# Patient Record
Sex: FEMALE | Race: WHITE | NOT HISPANIC OR LATINO | Employment: FULL TIME
[De-identification: names, ages, dates, MRNs, and addresses within clinical notes are randomized per-mention and may not be internally consistent; named-entity substitution may affect disease eponyms.]

---

## 2017-06-10 ENCOUNTER — HEALTH MAINTENANCE LETTER (OUTPATIENT)
Age: 22
End: 2017-06-10

## 2017-07-01 ENCOUNTER — HEALTH MAINTENANCE LETTER (OUTPATIENT)
Age: 22
End: 2017-07-01

## 2017-08-12 ENCOUNTER — HEALTH MAINTENANCE LETTER (OUTPATIENT)
Age: 22
End: 2017-08-12

## 2017-12-19 ENCOUNTER — TELEPHONE (OUTPATIENT)
Dept: PEDIATRICS | Facility: CLINIC | Age: 22
End: 2017-12-19

## 2019-01-05 ENCOUNTER — NURSE TRIAGE (OUTPATIENT)
Dept: NURSING | Facility: CLINIC | Age: 24
End: 2019-01-05

## 2019-01-05 NOTE — TELEPHONE ENCOUNTER
Najma is suppose to  birth control and pharmacy is closed.  FNA advised to contact pharmacy on Monday or go into Urgent Care.

## 2020-08-04 ENCOUNTER — NURSE TRIAGE (OUTPATIENT)
Dept: NURSING | Facility: CLINIC | Age: 25
End: 2020-08-04

## 2020-08-05 NOTE — TELEPHONE ENCOUNTER
Patient reports that she was woken up by a sharp pain in her left ear.  Feels like it is coming from inside her ear.     Pain intermittent.  Feels like a pressure between the sharp pains.  Started about 20 minutes ago.     Home care advice given per protocol.     Nichol Yin RN/[facility]    COVID 19 Nurse Triage Plan/Patient Instructions    Please be aware that novel coronavirus (COVID-19) may be circulating in the community. If you develop symptoms such as fever, cough, or SOB or if you have concerns about the presence of another infection including coronavirus (COVID-19), please contact your health care provider or visit [email or web address].     Disposition/Instructions    Home care recommended. Follow home care protocol based instructions.    Thank you for taking steps to prevent the spread of this virus.  o Limit your contact with others.  o Wear a simple mask to cover your cough.  o Wash your hands well and often.    Resources    [facility]: About COVID-19: [email or web address]    CDC: What to Do If You're Sick: www.cdc.gov/coronavirus/2019-ncov/about/steps-when-sick.html    CDC: Ending Home Isolation: www.cdc.gov/coronavirus/2019-ncov/hcp/disposition-in-home-patients.html     CDC: Caring for Someone: www.cdc.gov/coronavirus/2019-ncov/if-you-are-sick/care-for-someone.html     [facility]: Interim Guidance for Hospital Discharge to Home: www.health.[facility].mn.us/diseases/coronavirus/hcp/hospdischarge.pdf    [facility] clinical trials (COVID-19 research studies): [email or web address].[facility]/n-clinical-trials     Below are the COVID-19 hotlines at the [place] Department of Health ([facility]). Interpreters are available.   o For health questions: Call 212-797-2850 or 1-860.253.7549 (7 a.m. to 7 p.m.)  o For questions about schools and childcare: Call 779-806-9737 or 1-890.647.7020 (7 a.m. to 7 p.m.)     Additional Information    Negative: Moving the earlobe or touching the ear  clearly increases the pain    Negative: Foreign body struck in the ear (e.g., bug, piece of cotton)    Negative: Followed an ear injury    Negative: [1] Recently diagnosed with otitis media AND [2] currently on oral antibiotics    Negative: [1] Stiff neck (unable to touch chin to chest) AND [2] fever    Negative: [1] Bony area of skull behind the ear is pink or swollen AND [2] fever    Negative: Fever > 104 F (40 C)    Negative: Patient sounds very sick or weak to the triager    Negative: [1] SEVERE pain AND [2] not improved 2 hours after taking analgesic medication (e.g., ibuprofen or acetaminophen)    Negative: Walking is very unsteady    Negative: Sudden onset of ear pain after long - thin object was inserted into the ear canal (e.g., pencil, Q-tip)    Negative: Diabetes mellitus or weak immune system (e.g., HIV positive, cancer chemo, splenectomy, organ transplant, chronic steroids)    Negative: New blurred vision or vision changes    Negative: White, yellow, or green discharge    Negative: Bloody discharge or unexplained bleeding from ear canal    Negative: Earache  (Exceptions: brief ear pain of < 60 minutes duration, earache occurring during air travel    Negative: Mild earache and ear congestion (fullness) occurring during air travel    Earache < 60 minutes duration that is now completely gone    Protocols used: EARACHE-A-AH

## 2021-02-01 ENCOUNTER — TRANSFERRED RECORDS (OUTPATIENT)
Dept: HEALTH INFORMATION MANAGEMENT | Facility: CLINIC | Age: 26
End: 2021-02-01

## 2021-02-01 LAB — PAP SMEAR - HIM PATIENT REPORTED: NEGATIVE

## 2021-03-09 ENCOUNTER — OFFICE VISIT (OUTPATIENT)
Dept: FAMILY MEDICINE | Facility: CLINIC | Age: 26
End: 2021-03-09
Payer: COMMERCIAL

## 2021-03-09 VITALS
DIASTOLIC BLOOD PRESSURE: 85 MMHG | RESPIRATION RATE: 16 BRPM | BODY MASS INDEX: 21.79 KG/M2 | HEART RATE: 75 BPM | WEIGHT: 123 LBS | TEMPERATURE: 98.1 F | HEIGHT: 63 IN | SYSTOLIC BLOOD PRESSURE: 122 MMHG | OXYGEN SATURATION: 100 %

## 2021-03-09 DIAGNOSIS — Z00.00 ROUTINE GENERAL MEDICAL EXAMINATION AT A HEALTH CARE FACILITY: Primary | ICD-10-CM

## 2021-03-09 PROCEDURE — 99385 PREV VISIT NEW AGE 18-39: CPT | Performed by: PHYSICIAN ASSISTANT

## 2021-03-09 ASSESSMENT — MIFFLIN-ST. JEOR: SCORE: 1277.05

## 2021-03-09 NOTE — PROGRESS NOTES
SUBJECTIVE:   CC: Najma Rodríguez is an 25 year old woman who presents for preventive health visit.     Patient has been advised of split billing requirements and indicates understanding: Yes  Healthy Habits:     Getting at least 3 servings of Calcium per day:  NO    Bi-annual eye exam:  Yes    Dental care twice a year:  Yes    Sleep apnea or symptoms of sleep apnea:  None    Diet:  Vegetarian/vegan    Frequency of exercise:  2-3 days/week    Duration of exercise:  15-30 minutes    Taking medications regularly:  Yes    Medication side effects:  None    PHQ-2 Total Score: 0    Additional concerns today:  No    Pap done at  Feb 2021, within normal limits.  IUD placed 11/20    Today's PHQ-2 Score:   PHQ-2 ( 1999 Pfizer) 3/9/2021   Q1: Little interest or pleasure in doing things 0   Q2: Feeling down, depressed or hopeless 0   PHQ-2 Score 0   Q1: Little interest or pleasure in doing things Not at all   Q2: Feeling down, depressed or hopeless Not at all   PHQ-2 Score 0       Abuse: Current or Past (Physical, Sexual or Emotional) - No  Do you feel safe in your environment? Yes    Have you ever done Advance Care Planning? (For example, a Health Directive, POLST, or a discussion with a medical provider or your loved ones about your wishes): No, advance care planning information given to patient to review.  Patient plans to discuss their wishes with loved ones or provider.      Social History     Tobacco Use     Smoking status: Never Smoker     Smokeless tobacco: Never Used   Substance Use Topics     Alcohol use: No         Alcohol Use 3/9/2021   Prescreen: >3 drinks/day or >7 drinks/week? No       Reviewed orders with patient.  Reviewed health maintenance and updated orders accordingly - Yes  Labs reviewed in EPIC  BP Readings from Last 3 Encounters:   03/09/21 122/85   07/30/15 104/60   06/10/14 108/64    Wt Readings from Last 3 Encounters:   03/09/21 55.8 kg (123 lb)   07/30/15 50.3 kg (110 lb 12.8 oz) (18 %, Z=  -0.92)*   06/10/14 48.4 kg (106 lb 9.6 oz) (14 %, Z= -1.08)*     * Growth percentiles are based on CDC (Girls, 2-20 Years) data.                  Patient Active Problem List   Diagnosis     Acne     Short stature     Pituitary dwarfism (H)     History reviewed. No pertinent surgical history.    Social History     Tobacco Use     Smoking status: Never Smoker     Smokeless tobacco: Never Used   Substance Use Topics     Alcohol use: No     History reviewed. No pertinent family history.      Current Outpatient Medications   Medication Sig Dispense Refill     Multiple Vitamin (MULTIVITAMIN OR) Take  by mouth daily.       No Known Allergies  No lab results found.         History of abnormal Pap smear: NO - age 21-29 PAP every 3 years recommended; [facility] 02/2021 WNL/NIL     Reviewed and updated as needed this visit by clinical staff  Tobacco  Allergies  Meds  Problems  Med Hx  Surg Hx  Fam Hx          Reviewed and updated as needed this visit by Provider  Tobacco  Allergies  Meds  Problems  Med Hx  Surg Hx  Fam Hx         History reviewed. No pertinent past medical history.   History reviewed. No pertinent surgical history.  OB History   No obstetric history on file.       Review of Systems  CONSTITUTIONAL: NEGATIVE for fever, chills, change in weight  INTEGUMENTARU/SKIN: NEGATIVE for worrisome rashes, moles or lesions  EYES: NEGATIVE for vision changes or irritation  ENT: NEGATIVE for ear, mouth and throat problems  RESP: NEGATIVE for significant cough or SOB  BREAST: NEGATIVE for masses, tenderness or discharge  CV: NEGATIVE for chest pain, palpitations or peripheral edema  GI: NEGATIVE for nausea, abdominal pain, heartburn, or change in bowel habits  : NEGATIVE for unusual urinary or vaginal symptoms. Periods are regular.  MUSCULOSKELETAL: NEGATIVE for significant arthralgias or myalgia  NEURO: NEGATIVE for weakness, dizziness or paresthesias  PSYCHIATRIC: NEGATIVE for changes in mood or  "affect     OBJECTIVE:   /85   Pulse 75   Temp 98.1  F (36.7  C)   Resp 16   Ht 1.6 m (5' 3\")   Wt 55.8 kg (123 lb)   SpO2 100%   BMI 21.79 kg/m    Physical Exam  GENERAL: healthy, alert and no distress  EYES: Eyes grossly normal to inspection, PERRL and conjunctivae and sclerae normal  HENT: ear canals and TM's normal, nose and mouth without ulcers or lesions  NECK: no adenopathy, no asymmetry, masses, or scars and thyroid normal to palpation  RESP: lungs clear to auscultation - no rales, rhonchi or wheezes  CV: regular rate and rhythm, normal S1 S2, no S3 or S4, no murmur, click or rub, no peripheral edema and peripheral pulses strong  ABDOMEN: soft, nontender, no hepatosplenomegaly, no masses and bowel sounds normal  MS: no gross musculoskeletal defects noted, no edema  SKIN: no suspicious lesions or rashes  NEURO: Normal strength and tone, mentation intact and speech normal  PSYCH: mentation appears normal, affect normal/bright    Diagnostic Test Results:  Labs reviewed in Epic    ASSESSMENT/PLAN:       ICD-10-CM    1. Routine general medical examination at a health care facility  Z00.00        Patient has been advised of split billing requirements and indicates understanding: Yes  COUNSELING:  Reviewed preventive health counseling, as reflected in patient instructions       Regular exercise       Healthy diet/nutrition       Safe sex practices/STD prevention    Estimated body mass index is 21.79 kg/m  as calculated from the following:    Height as of this encounter: 1.6 m (5' 3\").    Weight as of this encounter: 55.8 kg (123 lb).        She reports that she has never smoked. She has never used smokeless tobacco.      Counseling Resources:  ATP IV Guidelines  Pooled Cohorts Equation Calculator  Breast Cancer Risk Calculator  BRCA-Related Cancer Risk Assessment: FHS-7 Tool  FRAX Risk Assessment  ICSI Preventive Guidelines  Dietary Guidelines for Americans, 2010  USDA's MyPlate  ASA Prophylaxis  Lung " CA Screening    Mindi Olson PA-C  [facility]

## 2021-06-03 ENCOUNTER — E-VISIT (OUTPATIENT)
Dept: FAMILY MEDICINE | Facility: CLINIC | Age: 26
End: 2021-06-03
Payer: COMMERCIAL

## 2021-06-03 DIAGNOSIS — L70.0 ACNE VULGARIS: Primary | ICD-10-CM

## 2021-06-03 PROCEDURE — 99421 OL DIG E/M SVC 5-10 MIN: CPT | Performed by: FAMILY MEDICINE

## 2021-06-04 RX ORDER — BENZOYL PEROXIDE 5 G/100G
GEL TOPICAL 2 TIMES DAILY
Qty: 90 G | Refills: 2 | Status: SHIPPED | OUTPATIENT
Start: 2021-06-04 | End: 2022-05-05 | Stop reason: ALTCHOICE

## 2021-06-04 RX ORDER — CLINDAMYCIN PHOSPHATE 10 UG/ML
LOTION TOPICAL 2 TIMES DAILY
Qty: 60 ML | Refills: 2 | Status: SHIPPED | OUTPATIENT
Start: 2021-06-04 | End: 2022-05-05 | Stop reason: ALTCHOICE

## 2021-06-04 NOTE — PATIENT INSTRUCTIONS
"FUTURE APPOINTMENTS  Follow up in 3 week(s)    ACNE TREATMENT PLAN  Morning or Evening (whenever you shower) :    OTC cleanser with benzoyl peroxide 5% wash (not gel form)..    Do a \"20/10\" wash (20 seconds of skin contact with the cleanser followed by a 10 second rinse)    Apply to forehead, cheeks, chin, nose, jawline and neck.  (FYI Note - Benzoyl peroxide washes can bleach clothing, so try to use disposable or old towels and clothes.)    After cleansing, medication : apply topical Clindamycin 1%.    Evening:    OTC cleanser with benzoyl peroxide 5% wash (not gel form). - Do a \"20 / 10 wash\" again.    After cleansing, medication: apply topical Clindamycin 1%.    Recommendations if skin becomes too dry in response to medication:    Use Cetaphil facial moisturizer.    Decrease use of Benzaderm or Benzoyl Peroxide wash to once per day. If still too irritating, decrease benzoyl peroxide product to 5%.    Recommended brands include: PanOxyl 4% creamy wash, Clean & Clear Advantage oil absorbing 5.5% cleanser, Neutrogena Clear Pore 3.7% daily scrub, CVS 4% creamy face wash. Topix Benzaderm (changing name to Replenix) 5% wash can be found on amazon.com.      "

## 2021-06-28 ENCOUNTER — VIRTUAL VISIT (OUTPATIENT)
Dept: DERMATOLOGY | Facility: CLINIC | Age: 26
End: 2021-06-28
Payer: COMMERCIAL

## 2021-06-28 DIAGNOSIS — L70.0 ACNE VULGARIS: Primary | ICD-10-CM

## 2021-06-28 DIAGNOSIS — L81.0 POSTINFLAMMATORY HYPERPIGMENTATION: ICD-10-CM

## 2021-06-28 PROCEDURE — 99203 OFFICE O/P NEW LOW 30 MIN: CPT | Mod: 95 | Performed by: DERMATOLOGY

## 2021-06-28 ASSESSMENT — PAIN SCALES - GENERAL: PAINLEVEL: NO PAIN (0)

## 2021-06-28 NOTE — LETTER
6/28/2021       RE: Najma Rodríguez  [address]  [place] [address]     Dear Colleague,    Thank you for referring your patient, Najma Rodríguez, to the [facility] DERMATOLOGY [facility] [place] at [facility]. Please see a copy of my visit note below.    [facility] Dermatology Note  Encounter Date: Jun 28, 2021  Store-and-Forward and Video ([phone]). Location of teledermatologist: [facility] [place].  Start time: 8:35. End time: 9:03.    Dermatology Problem List:  1. Acne vulgaris: mixed inflammatory/comedonal with postinflammatory dyspigmentation  - proposed treatment: spironolactone 100 mg at bedtime, tretinoin 0.025% cream, benzoyl peroxide 5% wash (patient to decide what she is comfortable with)  - has hormonal IUD    ____________________________________________    Assessment & Plan:     1. Acne vulgaris: mixed inflammatory/comedonal with postinflammatory dyspigmentation. We spent some time discussing next steps in treatment including risks/benefits of tretinoin, benzoyl peroxide, spironolactone, oral contraceptives, adapalene, tazarotene, clindamycin. I do recommend spironolactone + topicals to achieve best results; concomitant hormonal IUD is in place. Patient will think about treatment options and contact us with her preferred plan.    Procedures Performed:    None    Follow-up: 3 months    Over 33 minutes was spent during this visit on the date of service, including >28 minutes of direct contact time with the patient counseling and coordinating care including the discussion and documentation of diagnosis, natural history, treatment, and prognosis. This excludes time spent performing any relevant procedures.    Staff:     Tawanda Ambriz MD   of Dermatology  Department of Dermatology  [facility]  [facility]    ____________________________________________    CC: Acne (Najma, is being seen for acne, no other concerns )    HPI:  Ms. Najma [place] is a(n) 26 year old female who presents today as a new patient for acne    Acne - consistent throughout middle school/high school  - started 12 years ago  - oral contraceptives in high school - helped acne - did lead to improvement throughout college but always had a few spots  - over last few months - coming back worse than in past  - changed to IUD in November  - skin care routine - using CeraVe cleanser BID, toner spray, moisturizer  - used clindamycin gel with tazarotene many years ago while in middle school  - has a fair amount of residual dyspigmentation    Patient is otherwise feeling well, without additional skin concerns.    Labs Reviewed:  N/A    Physical Exam:  Vitals: There were no vitals taken for this visit.  SKIN: Teledermatology photos were reviewed; image quality and interpretability: acceptable. Image date: 6/28/21.  - erythematous acneiform papules and admixed comedones with background dyspigmentation on the face with chin/jawline predominance  - No other lesions of concern on areas examined.     Medications:  Current Outpatient Medications   Medication     benzoyl peroxide 5 % topical gel     clindamycin (CLEOCIN T) 1 % external lotion     Multiple Vitamin (MULTIVITAMIN OR)     No current facility-administered medications for this visit.       Past Medical/Surgical History:   Patient Active Problem List   Diagnosis     Acne     Short stature     Pituitary dwarfism (H)     History reviewed. No pertinent past medical history.    CC Referred MD Darian  No address on file on close of this encounter.

## 2021-06-28 NOTE — NURSING NOTE
Chief Complaint   Patient presents with     Acne     Najma, is being seen for acne, no other concerns     Enio Goldman EMT

## 2021-06-28 NOTE — PROGRESS NOTES
[facility] Dermatology Note  Encounter Date: Jun 28, 2021  Store-and-Forward and Video ([phone]). Location of teledermatologist: [facility] DERMATOLOGY CLINIC [place].  Start time: 8:35. End time: 9:03.    Dermatology Problem List:  1. Acne vulgaris: mixed inflammatory/comedonal with postinflammatory dyspigmentation  - proposed treatment: spironolactone 100 mg at bedtime, tretinoin 0.025% cream, benzoyl peroxide 5% wash (patient to decide what she is comfortable with)  - has hormonal IUD    ____________________________________________    Assessment & Plan:     1. Acne vulgaris: mixed inflammatory/comedonal with postinflammatory dyspigmentation. We spent some time discussing next steps in treatment including risks/benefits of tretinoin, benzoyl peroxide, spironolactone, oral contraceptives, adapalene, tazarotene, clindamycin. I do recommend spironolactone + topicals to achieve best results; concomitant hormonal IUD is in place. Patient will think about treatment options and contact us with her preferred plan.    Procedures Performed:    None    Follow-up: 3 months    Over 33 minutes was spent during this visit on the date of service, including >28 minutes of direct contact time with the patient counseling and coordinating care including the discussion and documentation of diagnosis, natural history, treatment, and prognosis. This excludes time spent performing any relevant procedures.    Staff:     Tawanda Ambriz MD   of Dermatology  Department of Dermatology  [employer] [facility] [employer] [facility] [employer]    ____________________________________________    CC: Acne (Najma, is being seen for acne, no other concerns )    HPI:  Ms. Najma Rodríguez is a(n) 26 year old female who presents today as a new patient for acne    Acne - consistent throughout middle school/high school  - started 12 years ago  - oral contraceptives in high school - helped acne - did  lead to improvement throughout college but always had a few spots  - over last few months - coming back worse than in past  - changed to IUD in November  - skin care routine - using CeraVe cleanser BID, toner spray, moisturizer  - used clindamycin gel with tazarotene many years ago while in middle school  - has a fair amount of residual dyspigmentation    Patient is otherwise feeling well, without additional skin concerns.    Labs Reviewed:  N/A    Physical Exam:  Vitals: There were no vitals taken for this visit.  SKIN: Teledermatology photos were reviewed; image quality and interpretability: acceptable. Image date: 6/28/21.  - erythematous acneiform papules and admixed comedones with background dyspigmentation on the face with chin/jawline predominance  - No other lesions of concern on areas examined.     Medications:  Current Outpatient Medications   Medication     benzoyl peroxide 5 % topical gel     clindamycin (CLEOCIN T) 1 % external lotion     Multiple Vitamin (MULTIVITAMIN OR)     No current facility-administered medications for this visit.       Past Medical/Surgical History:   Patient Active Problem List   Diagnosis     Acne     Short stature     Pituitary dwarfism (H)     History reviewed. No pertinent past medical history.    CC Referred MD Darian  No address on file on close of this encounter.

## 2021-07-07 DIAGNOSIS — L70.0 ACNE VULGARIS: Primary | ICD-10-CM

## 2021-07-07 RX ORDER — TRETINOIN 0.25 MG/G
CREAM TOPICAL AT BEDTIME
Qty: 45 G | Refills: 3 | Status: SHIPPED | OUTPATIENT
Start: 2021-07-07 | End: 2021-09-20 | Stop reason: ALTCHOICE

## 2021-07-07 RX ORDER — SPIRONOLACTONE 50 MG/1
50 TABLET, FILM COATED ORAL AT BEDTIME
Qty: 30 TABLET | Refills: 3 | Status: SHIPPED | OUTPATIENT
Start: 2021-07-07 | End: 2021-09-20

## 2021-09-20 ENCOUNTER — VIRTUAL VISIT (OUTPATIENT)
Dept: DERMATOLOGY | Facility: CLINIC | Age: 26
End: 2021-09-20
Payer: COMMERCIAL

## 2021-09-20 DIAGNOSIS — L70.0 ACNE VULGARIS: Primary | ICD-10-CM

## 2021-09-20 PROCEDURE — 99213 OFFICE O/P EST LOW 20 MIN: CPT | Mod: 95 | Performed by: DERMATOLOGY

## 2021-09-20 RX ORDER — SPIRONOLACTONE 50 MG/1
150 TABLET, FILM COATED ORAL AT BEDTIME
Qty: 90 TABLET | Refills: 3 | Status: SHIPPED | OUTPATIENT
Start: 2021-09-20 | End: 2022-02-04

## 2021-09-20 RX ORDER — ADAPALENE 0.1 G/100G
CREAM TOPICAL AT BEDTIME
Qty: 45 G | Refills: 3 | Status: SHIPPED | OUTPATIENT
Start: 2021-09-20 | End: 2022-05-18

## 2021-09-20 ASSESSMENT — PAIN SCALES - GENERAL: PAINLEVEL: NO PAIN (0)

## 2021-09-20 NOTE — NURSING NOTE
Dermatology Rooming Note    Ravindra Rodríguez's goals for this visit include:   Chief Complaint   Patient presents with     Derm Problem     ravindra is having this visit today for a follow up on the acne, states that nothing has changed since her last visit      Danita Flor CMA on 9/20/2021 at 8:06 AM

## 2021-09-20 NOTE — PROGRESS NOTES
[facility] Dermatology Note  Encounter Date: Sep 20, 2021  Store-and-Forward and Video (invitation sent by [employer]). Location of teledermatologist: [facility] DERMAT[facility] [place]. Date of images: 09/20/21. Start time: 8:39. End time: 8:56    Dermatology Problem List:  1. Acne vulgaris: mixed inflammatory/comedonal with postinflammatory dyspigmentation  - spironolactone 150 mg at bedtime, adapalene 0.1% cream, benzoyl peroxide 5% wash  - has hormonal IUD  - past treatment: spironolactone 50 mg at bedtime (ineffective), tretinoin 0.025% cream (too irritating)    ____________________________________________    Assessment & Plan:     1. Acne vulgaris: no significant improvement on low-dose spironolactone. Having some irritation with tretinoin so will downtitrate to adapalene to see if can increase frequency while decreasing irritation. Discussed risks/benefits of increasing spironolactone vs isotretinoin; patient would prefer to avoid isotretinoin at this time.  - increase spironolactone to 100 mg at bedtime x2 weeks, then to 150 mg at bedtime thereafter  - if insufficient improvement on this dose at follow up, would plan for isotretinoin  - stop tretinoin and switch to adapalene 0.1% cream at bedtime      Procedures Performed:    None    Follow-up: 3 months    Staff:     Tawanda Ambriz MD, FAAD   of Dermatology  Department of Dermatology  [facility]    ____________________________________________    CC: Derm Problem (ravindra is having this visit today for a follow up on the acne, states that nothing has changed since her last visit )    HPI:  Ms. Ravindra Rodríguez is a(n) 26 year old female who presents today as a return patient for acne vulgaris    Acne vulgaris - haven't noticed much benefit - on spironolactone 50 mg  - using benzoyl peroxide wash; tretinoin cream at bedtime - could use 4 days per week before too much  irritation  - concern about potential side effects with isotretinoin    Patient is otherwise feeling well, without additional skin concerns.    Labs Reviewed:  N/A    Physical Exam:  Vitals: There were no vitals taken for this visit.  SKIN: Teledermatology photos were reviewed; image quality and interpretability: acceptable. Image date: 9/20/21.  - acneiform papules on the face; interval stability  - No other lesions of concern on areas examined.     Medications:  Current Outpatient Medications   Medication     benzoyl peroxide 5 % external liquid     benzoyl peroxide 5 % topical gel     clindamycin (CLEOCIN T) 1 % external lotion     Multiple Vitamin (MULTIVITAMIN OR)     spironolactone (ALDACTONE) 50 MG tablet     tretinoin (RETIN-A) 0.025 % external cream     No current facility-administered medications for this visit.      Past Medical/Surgical History:   Patient Active Problem List   Diagnosis     Acne     Short stature     Pituitary dwarfism (H)     No past medical history on file.

## 2021-09-20 NOTE — PATIENT INSTRUCTIONS
Spironolactone: take 1/2 tablet (50 mg) at bedtime for 2 weeks. If tolerating, increase to 1 tablet (100 mg) at bedtime thereafter.    Adapalene: use pea-sized amount to whole face every 2-3 nights. Gradually increase to nightly use as tolerated over 2-3 months.

## 2021-09-20 NOTE — LETTER
9/20/2021       RE: Ravindra Rodríguez  [address]  [facility] [address]     Dear Colleague,    Thank you for referring your patient, Ravindra Rodríguez, to the [facility] [place] at [facility]. Please see a copy of my visit note below.    [facility] Dermatology Note  Encounter Date: Sep 20, 2021  Store-and-Forward and Video (invitation sent by [employer]). Location of teledermatologist: [facility] [place]. Date of images: 09/20/21. Start time: 8:39. End time: 8:56    Dermatology Problem List:  1. Acne vulgaris: mixed inflammatory/comedonal with postinflammatory dyspigmentation  - spironolactone 150 mg at bedtime, adapalene 0.1% cream, benzoyl peroxide 5% wash  - has hormonal IUD  - past treatment: spironolactone 50 mg at bedtime (ineffective), tretinoin 0.025% cream (too irritating)    ____________________________________________    Assessment & Plan:     1. Acne vulgaris: no significant improvement on low-dose spironolactone. Having some irritation with tretinoin so will downtitrate to adapalene to see if can increase frequency while decreasing irritation. Discussed risks/benefits of increasing spironolactone vs isotretinoin; patient would prefer to avoid isotretinoin at this time.  - increase spironolactone to 100 mg at bedtime x2 weeks, then to 150 mg at bedtime thereafter  - if insufficient improvement on this dose at follow up, would plan for isotretinoin  - stop tretinoin and switch to adapalene 0.1% cream at bedtime      Procedures Performed:    None    Follow-up: 3 months    Staff:     Tawanda Ambriz MD, FAAD   of Dermatology  Department of Dermatology  [facility]  ____________________________________________    CC: Derm Problem (ravindra is having this visit today for a follow up on the acne, states that nothing has  changed since her last visit )    HPI:  Ms. Najma Rodríguez is a(n) 26 year old female who presents today as a return patient for acne vulgaris    Acne vulgaris - haven't noticed much benefit - on spironolactone 50 mg  - using benzoyl peroxide wash; tretinoin cream at bedtime - could use 4 days per week before too much irritation  - concern about potential side effects with isotretinoin    Patient is otherwise feeling well, without additional skin concerns.    Labs Reviewed:  N/A    Physical Exam:  Vitals: There were no vitals taken for this visit.  SKIN: Teledermatology photos were reviewed; image quality and interpretability: acceptable. Image date: 9/20/21.  - acneiform papules on the face; interval stability  - No other lesions of concern on areas examined.     Medications:  Current Outpatient Medications   Medication     benzoyl peroxide 5 % external liquid     benzoyl peroxide 5 % topical gel     clindamycin (CLEOCIN T) 1 % external lotion     Multiple Vitamin (MULTIVITAMIN OR)     spironolactone (ALDACTONE) 50 MG tablet     tretinoin (RETIN-A) 0.025 % external cream     No current facility-administered medications for this visit.      Past Medical/Surgical History:   Patient Active Problem List   Diagnosis     Acne     Short stature     Pituitary dwarfism (H)   No past medical history on file.

## 2021-10-24 ENCOUNTER — HEALTH MAINTENANCE LETTER (OUTPATIENT)
Age: 26
End: 2021-10-24

## 2021-12-20 ENCOUNTER — VIRTUAL VISIT (OUTPATIENT)
Dept: DERMATOLOGY | Facility: CLINIC | Age: 26
End: 2021-12-20
Payer: COMMERCIAL

## 2021-12-20 DIAGNOSIS — L70.0 ACNE VULGARIS: Primary | ICD-10-CM

## 2021-12-20 PROCEDURE — 99213 OFFICE O/P EST LOW 20 MIN: CPT | Mod: 95 | Performed by: DERMATOLOGY

## 2021-12-20 ASSESSMENT — PAIN SCALES - GENERAL: PAINLEVEL: NO PAIN (0)

## 2021-12-20 NOTE — LETTER
12/20/2021     RE: Najam Rodríguez  [address]  [facility] [address]     Dear Colleague,    Thank you for referring your patient, Najma Rodríguez, to the [facility] DERMATOLOGY [facility] [place] at [facility]. Please see a copy of my visit note below.    [facility] Dermatology Note  Encounter Date: Dec 20, 2021  [employer].  7:53-8:01    Dermatology Problem List:  1. Acne vulgaris: mixed inflammatory/comedonal with postinflammatory dyspigmentation  - spironolactone 150 mg at bedtime, adapalene 0.1% cream, benzoyl peroxide 5% wash  - has hormonal IUD  - past treatment: spironolactone 50 mg at bedtime (ineffective), tretinoin 0.025% cream (too irritating)     ____________________________________________    Assessment & Plan:     1. Acne vulgaris: overall doing well on spironolactone 150 mg at bedtime, adapalene every other day, and benzoyl peroxide nightly, with ~1 active pimple per month. We briefly discussed uptitration on spironolactone but given good response to date, will maintain current dose for now. Can gradually increase frequency of topical adapalene as tolerated, but okay to stay with every other day if too irritating.  - spironolactone 150 mg at bedtime  - adapalene 0.1% every other day, benzoyl peroxide 5% wash    Procedures Performed:    None    Follow-up: 6 months    Staff:     Tawanda Ambriz MD, FAAD   of Dermatology  Department of Dermatology  [facility] [employer]    ____________________________________________    CC: Acne (Najma, is here for an acne appt. )    HPI:  Ms. Najma Rodríguez is a(n) 26 year old female who presents today as a return patient for acne vulgaris    Acne vulgaris - haven't had breakouts recently - will get ~1 pimple/month  - tolerating 150 mg of spironolactone well without side effects  - using benzoyl peroxide wash nightly and  adapalene   - used adapalene daily for about a week, then too irritating, so reduced to every other day    Patient is otherwise feeling well, without additional skin concerns.    Labs Reviewed:  N/A    Physical Exam:  Vitals: There were no vitals taken for this visit.  SKIN: no photos  - No other lesions of concern on areas examined.     Medications:  Current Outpatient Medications   Medication     adapalene (DIFFERIN) 0.1 % external cream     benzoyl peroxide 5 % external liquid     benzoyl peroxide 5 % topical gel     clindamycin (CLEOCIN T) 1 % external lotion     Multiple Vitamin (MULTIVITAMIN OR)     spironolactone (ALDACTONE) 50 MG tablet     No current facility-administered medications for this visit.      Past Medical/Surgical History:   Patient Active Problem List   Diagnosis     Acne     Short stature     Pituitary dwarfism (H)     History reviewed. No pertinent past medical history.    CC Tawanda Ambriz MD  [address] [place] [id number] on close of this encounter.

## 2021-12-20 NOTE — NURSING NOTE
Chief Complaint   Patient presents with     Acne     Najma, is here for an acne appt.     Enio Goldman EMT

## 2021-12-20 NOTE — PROGRESS NOTES
[facility] Dermatology Note  Encounter Date: Dec 20, 2021  [employer].  7:53-8:01    Dermatology Problem List:  1. Acne vulgaris: mixed inflammatory/comedonal with postinflammatory dyspigmentation  - spironolactone 150 mg at bedtime, adapalene 0.1% cream, benzoyl peroxide 5% wash  - has hormonal IUD  - past treatment: spironolactone 50 mg at bedtime (ineffective), tretinoin 0.025% cream (too irritating)     ____________________________________________    Assessment & Plan:     1. Acne vulgaris: overall doing well on spironolactone 150 mg at bedtime, adapalene every other day, and benzoyl peroxide nightly, with ~1 active pimple per month. We briefly discussed uptitration on spironolactone but given good response to date, will maintain current dose for now. Can gradually increase frequency of topical adapalene as tolerated, but okay to stay with every other day if too irritating.  - spironolactone 150 mg at bedtime  - adapalene 0.1% every other day, benzoyl peroxide 5% wash    Procedures Performed:    None    Follow-up: 6 months    Staff:     Tawanda Ambriz MD, FAAD   of Dermatology  Department of Dermatology  [facility] [employer] [facility]    ____________________________________________    CC: Acne (Najma, is here for an acne appt. )    HPI:  Ms. Najma Rodríguez is a(n) 26 year old female who presents today as a return patient for acne vulgaris    Acne vulgaris - haven't had breakouts recently - will get ~1 pimple/month  - tolerating 150 mg of spironolactone well without side effects  - using benzoyl peroxide wash nightly and adapalene   - used adapalene daily for about a week, then too irritating, so reduced to every other day    Patient is otherwise feeling well, without additional skin concerns.    Labs Reviewed:  N/A    Physical Exam:  Vitals: There were no vitals taken for this visit.  SKIN: no photos  - No other lesions of concern on areas  examined.     Medications:  Current Outpatient Medications   Medication     adapalene (DIFFERIN) 0.1 % external cream     benzoyl peroxide 5 % external liquid     benzoyl peroxide 5 % topical gel     clindamycin (CLEOCIN T) 1 % external lotion     Multiple Vitamin (MULTIVITAMIN OR)     spironolactone (ALDACTONE) 50 MG tablet     No current facility-administered medications for this visit.      Past Medical/Surgical History:   Patient Active Problem List   Diagnosis     Acne     Short stature     Pituitary dwarfism (H)     History reviewed. No pertinent past medical history.    CC Tawanda Ambriz MD  [address] [place] [phone] on close of this encounter.

## 2022-02-01 ENCOUNTER — MYC MEDICAL ADVICE (OUTPATIENT)
Dept: DERMATOLOGY | Facility: CLINIC | Age: 27
End: 2022-02-01
Payer: COMMERCIAL

## 2022-02-04 DIAGNOSIS — L70.0 ACNE VULGARIS: ICD-10-CM

## 2022-02-04 RX ORDER — SPIRONOLACTONE 50 MG/1
150 TABLET, FILM COATED ORAL AT BEDTIME
Qty: 90 TABLET | Refills: 5 | Status: SHIPPED | OUTPATIENT
Start: 2022-02-04 | End: 2022-05-18

## 2022-04-10 ENCOUNTER — HEALTH MAINTENANCE LETTER (OUTPATIENT)
Age: 27
End: 2022-04-10

## 2022-05-04 NOTE — PATIENT INSTRUCTIONS
Patient to contact dermatology for scheduling changes: [phone]      Preventive Health Recommendations  Female Ages 21 to 25     Yearly exam:   See your health care provider every year in order to  Review health changes.   Discuss preventive care.    Review your medicines if your doctor has prescribed any.    You should be tested each year for STDs (sexually transmitted diseases).     Talk to your provider about how often you should have cholesterol testing.    Get a Pap test every three years. If you have an abnormal result, your doctor may have you test more often.    If you are at risk for diabetes, you should have a diabetes test (fasting glucose).     Shots:   Get a flu shot each year.   Get a tetanus shot every 10 years.   Consider getting the shot (vaccine) that prevents cervical cancer (Gardasil).    Nutrition:   Eat at least 5 servings of fruits and vegetables each day.  Eat whole-grain bread, whole-wheat pasta and brown rice instead of white grains and rice.  Get adequate Calcium and Vitamin D.     Lifestyle  Exercise at least 150 minutes a week each week (30 minutes a day, 5 days a week). This will help you control your weight and prevent disease.  Limit alcohol to one drink per day.  No smoking.   Wear sunscreen to prevent skin cancer.  See your dentist every six months for an exam and cleaning.

## 2022-05-04 NOTE — PROGRESS NOTES
SUBJECTIVE:   CC: Najma Rodríguez is an 26 year old woman who presents for preventive health visit.     Patient has been advised of split billing requirements and indicates understanding: Yes  Healthy Habits:     Getting at least 3 servings of Calcium per day:  NO    Bi-annual eye exam:  Yes    Dental care twice a year:  Yes    Sleep apnea or symptoms of sleep apnea:  None    Diet:  Vegetarian/vegan    Frequency of exercise:  2-3 days/week    Duration of exercise:  15-30 minutes    Taking medications regularly:  Yes    Medication side effects:  None    PHQ-2 Total Score: 1    Additional concerns today:  Yes    Patient reports increased cramps and bloating off/on for some time, maybe a few months, maybe a full year with progression.  Patient has always had a BM every other day or so but also some days frequently - no drastic changes though.  Patient has tried to look at diet with nothing standing out. Dairy may make it worse.      Today's PHQ-2 Score:   PHQ-2 ( 1999 Pfizer) 5/5/2022   Q1: Little interest or pleasure in doing things 0   Q2: Feeling down, depressed or hopeless 1   PHQ-2 Score 1   PHQ-2 Total Score (12-17 Years)- Positive if 3 or more points; Administer PHQ-A if positive -   Q1: Little interest or pleasure in doing things Not at all   Q2: Feeling down, depressed or hopeless Several days   PHQ-2 Score 1       Abuse: Current or Past (Physical, Sexual or Emotional) - No  Do you feel safe in your environment? Yes        Social History     Tobacco Use     Smoking status: Never Smoker     Smokeless tobacco: Never Used   Substance Use Topics     Alcohol use: No     If you drink alcohol do you typically have >3 drinks per day or >7 drinks per week? No    Alcohol Use 5/5/2022   Prescreen: >3 drinks/day or >7 drinks/week? No   Prescreen: >3 drinks/day or >7 drinks/week? -   No flowsheet data found.    Reviewed orders with patient.  Reviewed health maintenance and updated orders accordingly - Yes  Lab  work is in process  Labs reviewed in EPIC  BP Readings from Last 3 Encounters:   05/05/22 123/82   03/09/21 122/85   07/30/15 104/60    Wt Readings from Last 3 Encounters:   05/05/22 52.3 kg (115 lb 6.4 oz)   03/09/21 55.8 kg (123 lb)   07/30/15 50.3 kg (110 lb 12.8 oz) (18 %, Z= -0.92)*     * Growth percentiles are based on [facility] (Girls, 2-20 Years) data.                  Patient Active Problem List   Diagnosis     Acne     Pituitary dwarfism (H)     History reviewed. No pertinent surgical history.    Social History     Tobacco Use     Smoking status: Never Smoker     Smokeless tobacco: Never Used   Substance Use Topics     Alcohol use: No     History reviewed. No pertinent family history.      Current Outpatient Medications   Medication Sig Dispense Refill     adapalene (DIFFERIN) 0.1 % external cream Apply topically At Bedtime Pea-sized amount to whole face 45 g 3     benzoyl peroxide 5 % external liquid Apply topically daily 226 g 11     Multiple Vitamin (MULTIVITAMIN OR) Take  by mouth daily.       spironolactone (ALDACTONE) 50 MG tablet Take 3 tablets (150 mg) by mouth At Bedtime 90 tablet 5     No Known Allergies  No lab results found.     Breast Cancer Screening:  Any new diagnosis of family breast, ovarian, or bowel cancer? No    FHS-7: No flowsheet data found.    Patient under 40 years of age: Routine Mammogram Screening not recommended.   Pertinent mammograms are reviewed under the imaging tab.    History of abnormal Pap smear: NO - age 21-29 PAP every 3 years recommended     Reviewed and updated as needed this visit by clinical staff   Tobacco  Allergies  Meds  Problems  Med Hx  Surg Hx  Fam Hx            Reviewed and updated as needed this visit by Provider   Tobacco  Allergies  Meds  Problems  Med Hx  Surg Hx  Fam Hx             History reviewed. No pertinent past medical history.   History reviewed. No pertinent surgical history.    Review of Systems   Constitutional: Negative for chills  "and fever.   HENT: Negative for congestion, ear pain, hearing loss and sore throat.    Eyes: Negative for pain and visual disturbance.   Respiratory: Negative for cough and shortness of breath.    Cardiovascular: Negative for chest pain, palpitations and peripheral edema.   Gastrointestinal: Negative for abdominal pain, constipation, diarrhea, heartburn, hematochezia and nausea.   Breasts:  Negative for tenderness, breast mass and discharge.   Genitourinary: Positive for pelvic pain. Negative for dysuria, frequency, genital sores, hematuria, urgency, vaginal bleeding and vaginal discharge.   Musculoskeletal: Negative for arthralgias, joint swelling and myalgias.   Skin: Negative for rash.   Neurological: Negative for dizziness, weakness, headaches and paresthesias.   Psychiatric/Behavioral: Negative for mood changes. The patient is not nervous/anxious.         OBJECTIVE:   /82   Pulse 65   Temp 97.5  F (36.4  C) (Temporal)   Ht 1.613 m (5' 3.5\")   Wt 52.3 kg (115 lb 6.4 oz)   SpO2 99%   BMI 20.12 kg/m    Physical Exam  GENERAL: healthy, alert and no distress  EYES: Eyes grossly normal to inspection, PERRL and conjunctivae and sclerae normal  HENT: ear canals and TM's normal, nose and mouth without ulcers or lesions  NECK: no adenopathy, no asymmetry, masses, or scars and thyroid normal to palpation  RESP: lungs clear to auscultation - no rales, rhonchi or wheezes  CV: regular rate and rhythm, normal S1 S2, no S3 or S4, no murmur, click or rub, no peripheral edema and peripheral pulses strong  ABDOMEN: soft, nontender, no hepatosplenomegaly, no masses and bowel sounds normal  MS: no gross musculoskeletal defects noted, no edema  SKIN: no suspicious lesions or rashes  NEURO: Normal strength and tone, mentation intact and speech normal  PSYCH: mentation appears normal, affect normal/bright    Diagnostic Test Results:  Labs reviewed in Epic    ASSESSMENT/PLAN:       ICD-10-CM    1. Routine general medical " "examination at a health care facility  Z00.00 REVIEW OF HEALTH MAINTENANCE PROTOCOL ORDERS   2. Encounter for vitamin deficiency screening  Z13.21 Vitamin B12     Vitamin D Deficiency     Vitamin B12     Vitamin D Deficiency   3. Screening for thyroid disorder  Z13.29 TSH with free T4 reflex     TSH with free T4 reflex   4. Screening, anemia, deficiency, iron  Z13.0 CBC with Platelets & Differential     Ferritin     Iron & Iron Binding Capacity     CBC with Platelets & Differential     Ferritin     Iron & Iron Binding Capacity   5. Screening for diabetes mellitus  Z13.1 Comprehensive metabolic panel     Comprehensive metabolic panel   6. Need for hepatitis C screening test  Z11.59 Hepatitis C Screen Reflex to HCV RNA Quant and Genotype     Hepatitis C Screen Reflex to HCV RNA Quant and Genotype   7. Screening for HIV (human immunodeficiency virus)  Z11.4 HIV Antigen Antibody Combo     HIV Antigen Antibody Combo       Patient has been advised of split billing requirements and indicates understanding: Yes    COUNSELING:  Reviewed preventive health counseling, as reflected in patient instructions       Regular exercise       Healthy diet/nutrition    Estimated body mass index is 20.12 kg/m  as calculated from the following:    Height as of this encounter: 1.613 m (5' 3.5\").    Weight as of this encounter: 52.3 kg (115 lb 6.4 oz).        She reports that she has never smoked. She has never used smokeless tobacco.      Counseling Resources:  ATP IV Guidelines  Pooled Cohorts Equation Calculator  Breast Cancer Risk Calculator  BRCA-Related Cancer Risk Assessment: FHS-7 Tool  FRAX Risk Assessment  ICSI Preventive Guidelines  Dietary Guidelines for Americans, 2010  USDA's MyPlate  ASA Prophylaxis  Lung CA Screening    ABNER Marks [facility]  "

## 2022-05-05 ENCOUNTER — OFFICE VISIT (OUTPATIENT)
Dept: FAMILY MEDICINE | Facility: CLINIC | Age: 27
End: 2022-05-05
Payer: COMMERCIAL

## 2022-05-05 VITALS
DIASTOLIC BLOOD PRESSURE: 82 MMHG | WEIGHT: 115.4 LBS | HEART RATE: 65 BPM | BODY MASS INDEX: 19.7 KG/M2 | SYSTOLIC BLOOD PRESSURE: 123 MMHG | TEMPERATURE: 97.5 F | HEIGHT: 64 IN | OXYGEN SATURATION: 99 %

## 2022-05-05 DIAGNOSIS — Z13.1 SCREENING FOR DIABETES MELLITUS: ICD-10-CM

## 2022-05-05 DIAGNOSIS — Z11.59 NEED FOR HEPATITIS C SCREENING TEST: ICD-10-CM

## 2022-05-05 DIAGNOSIS — Z11.4 SCREENING FOR HIV (HUMAN IMMUNODEFICIENCY VIRUS): ICD-10-CM

## 2022-05-05 DIAGNOSIS — Z13.0 SCREENING, ANEMIA, DEFICIENCY, IRON: ICD-10-CM

## 2022-05-05 DIAGNOSIS — Z13.21 ENCOUNTER FOR VITAMIN DEFICIENCY SCREENING: ICD-10-CM

## 2022-05-05 DIAGNOSIS — Z13.29 SCREENING FOR THYROID DISORDER: ICD-10-CM

## 2022-05-05 DIAGNOSIS — Z00.00 ROUTINE GENERAL MEDICAL EXAMINATION AT A HEALTH CARE FACILITY: Primary | ICD-10-CM

## 2022-05-05 LAB
BASOPHILS # BLD AUTO: 0.1 10E3/UL (ref 0–0.2)
BASOPHILS NFR BLD AUTO: 1 %
EOSINOPHIL # BLD AUTO: 0.3 10E3/UL (ref 0–0.7)
EOSINOPHIL NFR BLD AUTO: 2 %
ERYTHROCYTE [DISTWIDTH] IN BLOOD BY AUTOMATED COUNT: 12.1 % (ref 10–15)
HCT VFR BLD AUTO: 41.7 % (ref 35–47)
HGB BLD-MCNC: 14.5 G/DL (ref 11.7–15.7)
LYMPHOCYTES # BLD AUTO: 3.1 10E3/UL (ref 0.8–5.3)
LYMPHOCYTES NFR BLD AUTO: 28 %
MCH RBC QN AUTO: 31.7 PG (ref 26.5–33)
MCHC RBC AUTO-ENTMCNC: 34.8 G/DL (ref 31.5–36.5)
MCV RBC AUTO: 91 FL (ref 78–100)
MONOCYTES # BLD AUTO: 0.7 10E3/UL (ref 0–1.3)
MONOCYTES NFR BLD AUTO: 7 %
NEUTROPHILS # BLD AUTO: 6.9 10E3/UL (ref 1.6–8.3)
NEUTROPHILS NFR BLD AUTO: 62 %
PLATELET # BLD AUTO: 279 10E3/UL (ref 150–450)
RBC # BLD AUTO: 4.57 10E6/UL (ref 3.8–5.2)
VIT B12 SERPL-MCNC: 529 PG/ML (ref 193–986)
WBC # BLD AUTO: 11.1 10E3/UL (ref 4–11)

## 2022-05-05 PROCEDURE — 82306 VITAMIN D 25 HYDROXY: CPT | Performed by: PHYSICIAN ASSISTANT

## 2022-05-05 PROCEDURE — 83550 IRON BINDING TEST: CPT | Performed by: PHYSICIAN ASSISTANT

## 2022-05-05 PROCEDURE — 80050 GENERAL HEALTH PANEL: CPT | Performed by: PHYSICIAN ASSISTANT

## 2022-05-05 PROCEDURE — 82607 VITAMIN B-12: CPT | Performed by: PHYSICIAN ASSISTANT

## 2022-05-05 PROCEDURE — 82728 ASSAY OF FERRITIN: CPT | Performed by: PHYSICIAN ASSISTANT

## 2022-05-05 PROCEDURE — 86803 HEPATITIS C AB TEST: CPT | Performed by: PHYSICIAN ASSISTANT

## 2022-05-05 PROCEDURE — 36415 COLL VENOUS BLD VENIPUNCTURE: CPT | Performed by: PHYSICIAN ASSISTANT

## 2022-05-05 PROCEDURE — 87389 HIV-1 AG W/HIV-1&-2 AB AG IA: CPT | Performed by: PHYSICIAN ASSISTANT

## 2022-05-05 PROCEDURE — 99395 PREV VISIT EST AGE 18-39: CPT | Performed by: PHYSICIAN ASSISTANT

## 2022-05-05 ASSESSMENT — ENCOUNTER SYMPTOMS
DYSURIA: 0
HEADACHES: 0
ABDOMINAL PAIN: 0
SHORTNESS OF BREATH: 0
CHILLS: 0
MYALGIAS: 0
DIZZINESS: 0
PARESTHESIAS: 0
WEAKNESS: 0
FREQUENCY: 0
PALPITATIONS: 0
JOINT SWELLING: 0
BREAST MASS: 0
ARTHRALGIAS: 0
NERVOUS/ANXIOUS: 0
EYE PAIN: 0
NAUSEA: 0
DIARRHEA: 0
FEVER: 0
COUGH: 0
HEMATOCHEZIA: 0
SORE THROAT: 0
HEMATURIA: 0
CONSTIPATION: 0
HEARTBURN: 0

## 2022-05-06 ENCOUNTER — MYC MEDICAL ADVICE (OUTPATIENT)
Dept: FAMILY MEDICINE | Facility: CLINIC | Age: 27
End: 2022-05-06
Payer: COMMERCIAL

## 2022-05-06 LAB
ALBUMIN SERPL-MCNC: 4.5 G/DL (ref 3.4–5)
ALP SERPL-CCNC: 44 U/L (ref 40–150)
ALT SERPL W P-5'-P-CCNC: 21 U/L (ref 0–50)
ANION GAP SERPL CALCULATED.3IONS-SCNC: 5 MMOL/L (ref 3–14)
AST SERPL W P-5'-P-CCNC: 17 U/L (ref 0–45)
BILIRUB SERPL-MCNC: 0.5 MG/DL (ref 0.2–1.3)
BUN SERPL-MCNC: 6 MG/DL (ref 7–30)
CALCIUM SERPL-MCNC: 9.2 MG/DL (ref 8.5–10.1)
CHLORIDE BLD-SCNC: 104 MMOL/L (ref 94–109)
CO2 SERPL-SCNC: 28 MMOL/L (ref 20–32)
CREAT SERPL-MCNC: 0.61 MG/DL (ref 0.52–1.04)
DEPRECATED CALCIDIOL+CALCIFEROL SERPL-MC: 36 UG/L (ref 20–75)
FERRITIN SERPL-MCNC: 32 NG/ML (ref 12–150)
GFR SERPL CREATININE-BSD FRML MDRD: >90 ML/MIN/1.73M2
GLUCOSE BLD-MCNC: 87 MG/DL (ref 70–99)
HCV AB SERPL QL IA: NONREACTIVE
HIV 1+2 AB+HIV1 P24 AG SERPL QL IA: NONREACTIVE
IRON SATN MFR SERPL: 24 % (ref 15–46)
IRON SERPL-MCNC: 94 UG/DL (ref 35–180)
POTASSIUM BLD-SCNC: 3.7 MMOL/L (ref 3.4–5.3)
PROT SERPL-MCNC: 8.4 G/DL (ref 6.8–8.8)
SODIUM SERPL-SCNC: 137 MMOL/L (ref 133–144)
TIBC SERPL-MCNC: 396 UG/DL (ref 240–430)
TSH SERPL DL<=0.005 MIU/L-ACNC: 0.95 MU/L (ref 0.4–4)

## 2022-05-06 NOTE — RESULT ENCOUNTER NOTE
"Kaylen Yao  Your attached labs are normal. Keep up the good work!  Please contact the office with any questions or concerns.    Mindi Campos \"Erick\" ABNER Olson    "

## 2022-05-18 ENCOUNTER — OFFICE VISIT (OUTPATIENT)
Dept: FAMILY MEDICINE | Facility: CLINIC | Age: 27
End: 2022-05-18
Payer: COMMERCIAL

## 2022-05-18 VITALS — DIASTOLIC BLOOD PRESSURE: 62 MMHG | SYSTOLIC BLOOD PRESSURE: 112 MMHG

## 2022-05-18 DIAGNOSIS — L70.0 ACNE VULGARIS: Primary | ICD-10-CM

## 2022-05-18 DIAGNOSIS — L82.1 SEBORRHEIC KERATOSES: ICD-10-CM

## 2022-05-18 PROCEDURE — 99213 OFFICE O/P EST LOW 20 MIN: CPT | Performed by: NURSE PRACTITIONER

## 2022-05-18 RX ORDER — SPIRONOLACTONE 50 MG/1
150 TABLET, FILM COATED ORAL AT BEDTIME
Qty: 270 TABLET | Refills: 3 | Status: SHIPPED | OUTPATIENT
Start: 2022-05-18 | End: 2022-09-29

## 2022-05-18 RX ORDER — ADAPALENE 0.1 G/100G
CREAM TOPICAL AT BEDTIME
Qty: 45 G | Refills: 3 | Status: SHIPPED | OUTPATIENT
Start: 2022-05-18 | End: 2023-10-30

## 2022-05-18 NOTE — PROGRESS NOTES
[facility] Dermatology Note  Encounter Date: May 18, 2022  Office Visit     Reviewed patients past medical history and pertinent chart review prior to patients visit today.     Dermatology Problem List:  Acne, spironolactone 150 mg daily, adapalene 0.1% cream, benzyl peroxide  Seborrheic keratosis mons pubis x3    ____________________________________________    Assessment & Plan:     # Acne vulgaris    Continue spironolactone 150 mg daily. This is a medication used in high doses for blood pressure but in lower doses for acne due to its anti-male hormone effects. Main side effects are dizziness, menstrual spotting, breast tenderness, high potassium, and can be harmful to a fetus if you were to become pregnant. It is very important to avoid pregnancy while on this medication and to stop it immediately if you do become pregnant. Advised to stay well hydrated while on the medication and notify us if she were to feel heart palpitations. She is a young and healthy individual so we are not checking potassium levels regularly unless side effects occur.  Her potassium was normal on 5/5/2022.      # Seborrheic Keratosis. Benign, no further treatment needed.     Follow-up: 3 months for follow up if not well controlled. 1 year if well controlled on topical therapy only, sooner PRN new concerns    Rosalind Sharp, INES CNP on 5/18/2022 at 8:56 AM   _______________________________________    CC: Derm Problem (Birthmark concern on pelvic area-- pt reported it has changed, it looks darker.) and Acne (Follow up)    HPI:  Ms. Najma Rodríguez is a(n) 26 year old female who presents today for follow-up  for acne. Patient has acne on face for many years that has been very well controlled with use of spironolactone 150 mg at bedtime for the past year and a half.  She says she started at about 50 mg a day and slowly increased until finding that 150 mg kept her very well controlled.  She also has adapalene 0.1%  cream and benzyl peroxide that she also uses.  She denies any side effects and does say that very infrequently like once every couple of months she does feel like she might have 1 heart palpitations but that is it and it does not continue.  Potassium was checked on 5/5/2022 and was normal.    She also has 3 spots that she is calling birthmarks on the pubic area.  She does not know how long they have been here.  They are asymptomatic and not bothersome but that she thinks they may have changed.    Patient is otherwise feeling well, without additional skin concerns.      Physical Exam:  Vitals: /62   SKIN: Acne exam, which includes the face, neck, mons pubis was performed.  -Clear of any acne on the face today  -three brown cerebriform papules on mons pubis    - No other lesions of concern on areas examined.     Medications:  Current Outpatient Medications   Medication     adapalene (DIFFERIN) 0.1 % external cream     benzoyl peroxide 5 % external liquid     Multiple Vitamin (MULTIVITAMIN OR)     spironolactone (ALDACTONE) 50 MG tablet     No current facility-administered medications for this visit.      Past Medical History:   Patient Active Problem List   Diagnosis     Acne     Pituitary dwarfism (H)     History reviewed. No pertinent past medical history.    CC Referred MD Darian  No address on file on close of this encounter.

## 2022-05-18 NOTE — LETTER
5/18/2022         RE: Najma Rodríguez  [address]  [facility] [address]        Dear Colleague,    Thank you for referring your patient, Najma Rodríguez, to the [facility]. Please see a copy of my visit note below.    [facility] Dermatology Note  Encounter Date: May 18, 2022  Office Visit     Reviewed patients past medical history and pertinent chart review prior to patients visit today.     Dermatology Problem List:  Acne, spironolactone 150 mg daily, adapalene 0.1% cream, benzyl peroxide  Seborrheic keratosis mons pubis x3    ____________________________________________    Assessment & Plan:     # Acne vulgaris    Continue spironolactone 150 mg daily. This is a medication used in high doses for blood pressure but in lower doses for acne due to its anti-male hormone effects. Main side effects are dizziness, menstrual spotting, breast tenderness, high potassium, and can be harmful to a fetus if you were to become pregnant. It is very important to avoid pregnancy while on this medication and to stop it immediately if you do become pregnant. Advised to stay well hydrated while on the medication and notify us if she were to feel heart palpitations. She is a young and healthy individual so we are not checking potassium levels regularly unless side effects occur.  Her potassium was normal on 5/5/2022.      # Seborrheic Keratosis. Benign, no further treatment needed.     Follow-up: 3 months for follow up if not well controlled. 1 year if well controlled on topical therapy only, sooner PRN new concerns    Rosalind Sharp, APRN CNP on 5/18/2022 at 8:56 AM   _______________________________________    CC: Derm Problem (Birthmark concern on pelvic area-- pt reported it has changed, it looks darker.) and Acne (Follow up)    HPI:  Ms. Najma Rodríguez is a(n) 26 year old female who presents today for follow-up  for acne. Patient has acne on face for many  years that has been very well controlled with use of spironolactone 150 mg at bedtime for the past year and a half.  She says she started at about 50 mg a day and slowly increased until finding that 150 mg kept her very well controlled.  She also has adapalene 0.1% cream and benzyl peroxide that she also uses.  She denies any side effects and does say that very infrequently like once every couple of months she does feel like she might have 1 heart palpitations but that is it and it does not continue.  Potassium was checked on 5/5/2022 and was normal.    She also has 3 spots that she is calling birthmarks on the pubic area.  She does not know how long they have been here.  They are asymptomatic and not bothersome but that she thinks they may have changed.    Patient is otherwise feeling well, without additional skin concerns.      Physical Exam:  Vitals: /62   SKIN: Acne exam, which includes the face, neck, mons pubis was performed.  -Clear of any acne on the face today  -three brown cerebriform papules on mons pubis    - No other lesions of concern on areas examined.     Medications:  Current Outpatient Medications   Medication     adapalene (DIFFERIN) 0.1 % external cream     benzoyl peroxide 5 % external liquid     Multiple Vitamin (MULTIVITAMIN OR)     spironolactone (ALDACTONE) 50 MG tablet     No current facility-administered medications for this visit.      Past Medical History:   Patient Active Problem List   Diagnosis     Acne     Pituitary dwarfism (H)     History reviewed. No pertinent past medical history.    CC Referred MD Darian  No address on file on close of this encounter.       Again, thank you for allowing me to participate in the care of your patient.        Sincerely,        Rosalind Sharp, INES CNP

## 2022-09-28 ENCOUNTER — MYC MEDICAL ADVICE (OUTPATIENT)
Dept: DERMATOLOGY | Facility: CLINIC | Age: 27
End: 2022-09-28

## 2022-09-28 DIAGNOSIS — L70.0 ACNE VULGARIS: ICD-10-CM

## 2022-09-29 RX ORDER — SPIRONOLACTONE 50 MG/1
150 TABLET, FILM COATED ORAL AT BEDTIME
Qty: 90 TABLET | Refills: 3 | Status: SHIPPED | OUTPATIENT
Start: 2022-09-29 | End: 2022-10-03

## 2022-10-03 ENCOUNTER — OFFICE VISIT (OUTPATIENT)
Dept: DERMATOLOGY | Facility: CLINIC | Age: 27
End: 2022-10-03
Payer: COMMERCIAL

## 2022-10-03 DIAGNOSIS — L70.0 ACNE VULGARIS: ICD-10-CM

## 2022-10-03 PROCEDURE — 99214 OFFICE O/P EST MOD 30 MIN: CPT | Mod: GC | Performed by: DERMATOLOGY

## 2022-10-03 RX ORDER — SPIRONOLACTONE 50 MG/1
100 TABLET, FILM COATED ORAL AT BEDTIME
Qty: 180 TABLET | Refills: 3 | Status: SHIPPED | OUTPATIENT
Start: 2022-10-03 | End: 2023-10-09

## 2022-10-03 RX ORDER — SPIRONOLACTONE 50 MG/1
100 TABLET, FILM COATED ORAL AT BEDTIME
Qty: 180 TABLET | Refills: 3 | Status: SHIPPED | OUTPATIENT
Start: 2022-10-03 | End: 2022-10-03

## 2022-10-03 RX ORDER — SPIRONOLACTONE 50 MG/1
100 TABLET, FILM COATED ORAL AT BEDTIME
Qty: 180 TABLET | Refills: 3 | Status: CANCELLED | OUTPATIENT
Start: 2022-10-03

## 2022-10-03 ASSESSMENT — PAIN SCALES - GENERAL: PAINLEVEL: NO PAIN (0)

## 2022-10-03 NOTE — LETTER
10/3/2022       RE: Najma Rodríguez  [address]  [facility] [address]     Dear Colleague,    Thank you for referring your patient, Najma Rodríguez, to the [facility] DERMATOLOGY CLINIC [place] at [facility]. Please see a copy of my visit note below.    [facility] Dermatology Note  Encounter Date: Oct 3, 2022  Clinic visit     Dermatology Problem List:  1. Acne vulgaris: mixed inflammatory/comedonal with postinflammatory dyspigmentation  - spironolactone 100 mg at bedtime, adapalene 0.1% cream, benzoyl peroxide 5% wash  - has hormonal IUD  - past treatment: spironolactone 50 mg at bedtime (ineffective), tretinoin 0.025% cream (too irritating)     ____________________________________________    Assessment & Plan:     1. Acne vulgaris: overall doing well on spironolactone 100 mg at bedtime, adapalene every other day, and benzoyl peroxide nightly, with ~1 active pimple per month. Discussed gradually increasing frequency of adapalene to nightly but would hold off given changing seasons and irritation.   - spironolactone 100 mg at bedtime  - adapalene 0.1% every other day increase as tolerated, benzoyl peroxide 5% wash    Procedures Performed:    None    Follow-up: 1 year    Staff and Resident:      Ashley Fatima MD     The patient was seen and staffed with Dr. Dustin MD   I, Tatiana Chan MD, saw this patient with the resident and agree with the resident s findings and plan of care as documented in the resident s note.      ____________________________________________    CC: Acne (Follow up for facial acne. Pt reports acne is good.)    HPI:  Ms. Najma Rodríguez is a(n) 27 year old female who presents today as a return patient for acne vulgaris. Skin doing very well. Went down to spironolactone 100 mg nightly and is tolerating this dose. She continues on IUD. Continues nightly BPW and every other night  adapalene, too irritating if nightly. Notes few breakouts and improvement in skin texture and hyperpigmentation.     Patient is otherwise feeling well, without additional skin concerns.    Labs Reviewed:  N/A    Physical Exam:  Vitals: There were no vitals taken for this visit.  SKIN: focused exam of the face  - Few small comedonal papules superficially otherwise skin is clear   - No other lesions of concern on areas examined.     Medications:  Current Outpatient Medications   Medication     adapalene (DIFFERIN) 0.1 % external cream     benzoyl peroxide 5 % external liquid     Multiple Vitamin (MULTIVITAMIN OR)     spironolactone (ALDACTONE) 50 MG tablet     No current facility-administered medications for this visit.      Past Medical/Surgical History:   Patient Active Problem List   Diagnosis     Acne     Pituitary dwarfism (H)     History reviewed. No pertinent past medical history.    CC Tawanda Ambriz MD  [address] [place] [phone] on close of this encounter.

## 2022-10-03 NOTE — PROGRESS NOTES
[facility] Dermatology Note  Encounter Date: Oct 3, 2022  [employer].  7:53-8:01    Dermatology Problem List:  1. Acne vulgaris: mixed inflammatory/comedonal with postinflammatory dyspigmentation  - spironolactone 150 mg at bedtime, adapalene 0.1% cream, benzoyl peroxide 5% wash  - has hormonal IUD  - past treatment: spironolactone 50 mg at bedtime (ineffective), tretinoin 0.025% cream (too irritating)     ____________________________________________    Assessment & Plan:     1. Acne vulgaris: overall doing well on spironolactone 100 mg at bedtime, adapalene every other day, and benzoyl peroxide nightly, with ~1 active pimple per month. We briefly discussed uptitration on spironolactone but given good response to date, will maintain current dose for now. Can gradually increase frequency of topical adapalene as tolerated, but okay to stay with every other day if too irritating.  - spironolactone 150 mg at bedtime  - adapalene 0.1% every other day, benzoyl peroxide 5% wash    Procedures Performed:    None    Follow-up: 6 months    Staff and Resident:      Ashley Fatima MD     The patient was seen and staffed with Dr. Dustin MD     ____________________________________________    CC: No chief complaint on file.    HPI:  Ms. Najma Rodríguez is a(n) 27 year old female who presents today as a return patient for acne vulgaris    Acne vulgaris - haven't had breakouts recently - will get ~1 pimple/month  - tolerating 150 mg of spironolactone well without side effects  - using benzoyl peroxide wash nightly and adapalene   - used adapalene daily for about a week, then too irritating, so reduced to every other day    Patient is otherwise feeling well, without additional skin concerns.    Labs Reviewed:  N/A    Physical Exam:  Vitals: There were no vitals taken for this visit.  SKIN: no photos  - No other lesions of concern on areas examined.     Medications:  Current Outpatient  Medications   Medication     adapalene (DIFFERIN) 0.1 % external cream     benzoyl peroxide 5 % external liquid     Multiple Vitamin (MULTIVITAMIN OR)     spironolactone (ALDACTONE) 50 MG tablet     No current facility-administered medications for this visit.      Past Medical/Surgical History:   Patient Active Problem List   Diagnosis     Acne     Pituitary dwarfism (H)     No past medical history on file.    CC Tawanda Ambriz MD  [address] [place] [phone] on close of this encounter.

## 2022-10-03 NOTE — PATIENT INSTRUCTIONS
1. Acne vulgaris  - benzoyl peroxide 5 % external liquid; Apply topically daily  Dispense: 226 g; Refill: 11  - spironolactone (ALDACTONE) 50 MG tablet; Take 2 tablets (100 mg) by mouth At Bedtime  Dispense: 180 tablet; Refill: 3   - Continue every other night adapalene, increase to nightly as tolerated   - Continue excellent sun protection

## 2022-10-03 NOTE — NURSING NOTE
Chief Complaint   Patient presents with     Acne     Follow up for facial acne. Pt reports acne is good.     Garth Benson, EMT

## 2022-10-03 NOTE — PROGRESS NOTES
[facility] Dermatology Note  Encounter Date: Oct 3, 2022  Clinic visit     Dermatology Problem List:  1. Acne vulgaris: mixed inflammatory/comedonal with postinflammatory dyspigmentation  - spironolactone 100 mg at bedtime, adapalene 0.1% cream, benzoyl peroxide 5% wash  - has hormonal IUD  - past treatment: spironolactone 50 mg at bedtime (ineffective), tretinoin 0.025% cream (too irritating)     ____________________________________________    Assessment & Plan:     1. Acne vulgaris: overall doing well on spironolactone 100 mg at bedtime, adapalene every other day, and benzoyl peroxide nightly, with ~1 active pimple per month. Discussed gradually increasing frequency of adapalene to nightly but would hold off given changing seasons and irritation.   - spironolactone 100 mg at bedtime  - adapalene 0.1% every other day increase as tolerated, benzoyl peroxide 5% wash    Procedures Performed:    None    Follow-up: 1 year    Staff and Resident:      Ashley Fatima MD     The patient was seen and staffed with Dr. Dustin MD   I, Tatiana Chan MD, saw this patient with the resident and agree with the resident s findings and plan of care as documented in the resident s note.      ____________________________________________    CC: Acne (Follow up for facial acne. Pt reports acne is good.)    HPI:  Ms. Najma Rodríguez is a(n) 27 year old female who presents today as a return patient for acne vulgaris. Skin doing very well. Went down to spironolactone 100 mg nightly and is tolerating this dose. She continues on IUD. Continues nightly BPW and every other night adapalene, too irritating if nightly. Notes few breakouts and improvement in skin texture and hyperpigmentation.     Patient is otherwise feeling well, without additional skin concerns.    Labs Reviewed:  N/A    Physical Exam:  Vitals: There were no vitals taken for this visit.  SKIN: focused exam of the face  - Few small comedonal  papules superficially otherwise skin is clear   - No other lesions of concern on areas examined.     Medications:  Current Outpatient Medications   Medication     adapalene (DIFFERIN) 0.1 % external cream     benzoyl peroxide 5 % external liquid     Multiple Vitamin (MULTIVITAMIN OR)     spironolactone (ALDACTONE) 50 MG tablet     No current facility-administered medications for this visit.      Past Medical/Surgical History:   Patient Active Problem List   Diagnosis     Acne     Pituitary dwarfism (H)     History reviewed. No pertinent past medical history.    CC Tawanda Ambriz MD  [address] [place] [phone] on close of this encounter.

## 2022-10-16 ENCOUNTER — HEALTH MAINTENANCE LETTER (OUTPATIENT)
Age: 27
End: 2022-10-16

## 2023-03-25 ENCOUNTER — PATIENT OUTREACH (OUTPATIENT)
Dept: DERMATOLOGY | Facility: CLINIC | Age: 28
End: 2023-03-25
Payer: COMMERCIAL

## 2023-03-25 NOTE — TELEPHONE ENCOUNTER
Attempted to reach patient to schedule follow up in the Dermatology Clinic.  No answer,  LM on VM to call office and Aldera message sent..    Schedule with Dr. Ashley Fatima or Dustin 10/2023.

## 2023-04-20 ENCOUNTER — PATIENT OUTREACH (OUTPATIENT)
Dept: CARE COORDINATION | Facility: CLINIC | Age: 28
End: 2023-04-20
Payer: COMMERCIAL

## 2023-04-23 ENCOUNTER — TRANSFERRED RECORDS (OUTPATIENT)
Dept: MULTI SPECIALTY CLINIC | Facility: CLINIC | Age: 28
End: 2023-04-23

## 2023-04-23 LAB — PAP SMEAR - HIM PATIENT REPORTED: NORMAL

## 2023-05-04 ENCOUNTER — PATIENT OUTREACH (OUTPATIENT)
Dept: CARE COORDINATION | Facility: CLINIC | Age: 28
End: 2023-05-04
Payer: COMMERCIAL

## 2023-06-17 ENCOUNTER — HEALTH MAINTENANCE LETTER (OUTPATIENT)
Age: 28
End: 2023-06-17

## 2023-10-09 ENCOUNTER — MYC MEDICAL ADVICE (OUTPATIENT)
Dept: DERMATOLOGY | Facility: CLINIC | Age: 28
End: 2023-10-09
Payer: COMMERCIAL

## 2023-10-09 DIAGNOSIS — L70.0 ACNE VULGARIS: ICD-10-CM

## 2023-10-10 RX ORDER — SPIRONOLACTONE 50 MG/1
100 TABLET, FILM COATED ORAL AT BEDTIME
Qty: 60 TABLET | Refills: 0 | Status: SHIPPED | OUTPATIENT
Start: 2023-10-10 | End: 2023-10-30

## 2023-10-11 NOTE — TELEPHONE ENCOUNTER
Rx sent   [id number] - [place], [place] - [address] AT [facility] [address]      Natalia Larson, EMT

## 2023-10-30 ENCOUNTER — OFFICE VISIT (OUTPATIENT)
Dept: DERMATOLOGY | Facility: CLINIC | Age: 28
End: 2023-10-30
Payer: COMMERCIAL

## 2023-10-30 VITALS — HEART RATE: 103 BPM | DIASTOLIC BLOOD PRESSURE: 77 MMHG | SYSTOLIC BLOOD PRESSURE: 122 MMHG

## 2023-10-30 DIAGNOSIS — D23.9 DERMAL NEVUS: Primary | ICD-10-CM

## 2023-10-30 DIAGNOSIS — L70.0 ACNE VULGARIS: ICD-10-CM

## 2023-10-30 PROCEDURE — 99214 OFFICE O/P EST MOD 30 MIN: CPT | Mod: GC | Performed by: DERMATOLOGY

## 2023-10-30 RX ORDER — ADAPALENE 0.1 G/100G
CREAM TOPICAL AT BEDTIME
Qty: 45 G | Refills: 3 | Status: SHIPPED | OUTPATIENT
Start: 2023-10-30

## 2023-10-30 RX ORDER — SPIRONOLACTONE 50 MG/1
100 TABLET, FILM COATED ORAL AT BEDTIME
Qty: 60 TABLET | Refills: 0 | Status: SHIPPED | OUTPATIENT
Start: 2023-10-30 | End: 2023-12-16

## 2023-10-30 ASSESSMENT — PAIN SCALES - GENERAL: PAINLEVEL: NO PAIN (0)

## 2023-10-30 NOTE — LETTER
10/30/2023       RE: Najma Rodríguez  [address]  [facility] [address]     Dear Colleague,    Thank you for referring your patient, Najma Rodríguez, to the [facility] DERMATOLOGY CLINIC [place] at [facility]. Please see a copy of my visit note below.    [facility] Dermatology Note  Encounter Date: Oct 30, 2023  Clinic visit     Dermatology Problem List:  1. Acne vulgaris: mixed inflammatory/hormonal on the chin  - spironolactone 100 mg at bedtime, adapalene 0.1% cream, benzoyl peroxide 5% wash  - has hormonal IUD  - past treatment: spironolactone 50 mg at bedtime (ineffective), tretinoin 0.025% cream (too irritating)  2. Hyperpigmented verrucous plaques favor dermal nevus vs SK     ____________________________________________    Assessment & Plan:     1. Acne vulgaris: overall doing well on spironolactone 100 mg at bedtime, adapalene two times a week, and benzoyl peroxide two times a week, still getting a 1-2 new pimples in the chin area. Discussed gradually increasing frequency of adapalene to every other day, and alternating with Benzoyl peroxide to avoid dryness.   - Continue spironolactone 100 mg at bedtime, refilled today   - Recommend increasing adapalene 0.1% every other day increase as tolerated  - Recommend benzoyl peroxide 5% wash every other day, alternating the days with the adapalene to avoid further dryness.     2. Irritated Dermal Nevi x 3 on the Mons pubis. Counseled that these lesions are benign, can become irritated given the area.   -Will continue to monitor.     Procedures Performed:    None    Follow-up: 1 year    Staff and Resident:      Nata Granados MD  Dermatology Resident, PGY-3  I, Tatiana Chan MD, saw this patient with the resident and agree with the resident s findings and plan of care as documented in the resident s note.    ____________________________________________    CC: No chief  complaint on file.    HPI:  Ms. Najma Rodríguez is a(n) 28 year old female who presents today as a return patient for acne vulgaris.     -Since last being seen a year ago notes that her acne is overall stable.   -She pairs the Adapalene with the Benzoyl peroxide, and uses them together 2 days a week.   -She uses the Sprionolactone 100 mg nightly and has noticed little to no side effects.   -Notes that she ran out of her Spironolactone and was without it for a few days and noticed that she had a few new pimples on her chin while being off the medication.   -At last visit with CLRAK Sharp CNP, she had a few spots on her groin area evaluated and was told they are seborrheic keratoses. She has noticed that they can be itchy sometimes and appear darker and would like them evaluated today.     Patient is otherwise feeling well, without additional skin concerns.    Labs Reviewed:  N/A    Physical Exam:  Vitals: There were no vitals taken for this visit.  SKIN: focused exam of the face and mons pubis   - Few inflammatory papules on the chin, face otherwise clear   - Mons pubis with 3 identical pigmented homogenous papules without erythema, dermoscopy with no atypical pigment but thickened appearance.   - No other lesions of concern on areas examined.     Medications:  Current Outpatient Medications   Medication    adapalene (DIFFERIN) 0.1 % external cream    benzoyl peroxide 5 % external liquid    Multiple Vitamin (MULTIVITAMIN OR)    spironolactone (ALDACTONE) 50 MG tablet     No current facility-administered medications for this visit.      Past Medical/Surgical History:   Patient Active Problem List   Diagnosis    Acne    Pituitary dwarfism (H24)     No past medical history on file.    CC Tawanda Ambriz MD  [address] [place] [address] on close of this encounter.

## 2023-10-30 NOTE — NURSING NOTE
Dermatology Rooming Note    Najma Rodríguez's goals for this visit include:   Chief Complaint   Patient presents with    Acne     Najma is here today for a acne follow up      RICKEY Duong

## 2023-10-30 NOTE — PROGRESS NOTES
[facility] Dermatology Note  Encounter Date: Oct 30, 2023  Clinic visit     Dermatology Problem List:  1. Acne vulgaris: mixed inflammatory/hormonal on the chin  - spironolactone 100 mg at bedtime, adapalene 0.1% cream, benzoyl peroxide 5% wash  - has hormonal IUD  - past treatment: spironolactone 50 mg at bedtime (ineffective), tretinoin 0.025% cream (too irritating)  2. Hyperpigmented verrucous plaques favor dermal nevus vs SK     ____________________________________________    Assessment & Plan:     1. Acne vulgaris: overall doing well on spironolactone 100 mg at bedtime, adapalene two times a week, and benzoyl peroxide two times a week, still getting a 1-2 new pimples in the chin area. Discussed gradually increasing frequency of adapalene to every other day, and alternating with Benzoyl peroxide to avoid dryness.   - Continue spironolactone 100 mg at bedtime, refilled today   - Recommend increasing adapalene 0.1% every other day increase as tolerated  - Recommend benzoyl peroxide 5% wash every other day, alternating the days with the adapalene to avoid further dryness.     2. Irritated Dermal Nevi x 3 on the Mons pubis. Counseled that these lesions are benign, can become irritated given the area.   -Will continue to monitor.     Procedures Performed:    None    Follow-up: 1 year    Staff and Resident:      Nata Granados MD  Dermatology Resident, PGY-3  I, Tatiana Chan MD, saw this patient with the resident and agree with the resident s findings and plan of care as documented in the resident s note.    ____________________________________________    CC: No chief complaint on file.    HPI:  Ms. Najma Rodríguez is a(n) 28 year old female who presents today as a return patient for acne vulgaris.     -Since last being seen a year ago notes that her acne is overall stable.   -She pairs the Adapalene with the Benzoyl peroxide, and uses them together 2 days a week.   -She uses the  Sprionolactone 100 mg nightly and has noticed little to no side effects.   -Notes that she ran out of her Spironolactone and was without it for a few days and noticed that she had a few new pimples on her chin while being off the medication.   -At last visit with CLARK Sharp CNP, she had a few spots on her groin area evaluated and was told they are seborrheic keratoses. She has noticed that they can be itchy sometimes and appear darker and would like them evaluated today.     Patient is otherwise feeling well, without additional skin concerns.    Labs Reviewed:  N/A    Physical Exam:  Vitals: There were no vitals taken for this visit.  SKIN: focused exam of the face and mons pubis   - Few inflammatory papules on the chin, face otherwise clear   - Mons pubis with 3 identical pigmented homogenous papules without erythema, dermoscopy with no atypical pigment but thickened appearance.   - No other lesions of concern on areas examined.     Medications:  Current Outpatient Medications   Medication    adapalene (DIFFERIN) 0.1 % external cream    benzoyl peroxide 5 % external liquid    Multiple Vitamin (MULTIVITAMIN OR)    spironolactone (ALDACTONE) 50 MG tablet     No current facility-administered medications for this visit.      Past Medical/Surgical History:   Patient Active Problem List   Diagnosis    Acne    Pituitary dwarfism (H24)     No past medical history on file.    CC Tawanda Ambriz MD  [address] [place] [phone] on close of this encounter.

## 2023-11-03 ENCOUNTER — TELEPHONE (OUTPATIENT)
Dept: DERMATOLOGY | Facility: CLINIC | Age: 28
End: 2023-11-03
Payer: COMMERCIAL

## 2023-11-03 NOTE — TELEPHONE ENCOUNTER
Left Voicemail (1st Attempt) and Sent Mychart (1st Attempt) for the patient to call back and schedule the following:    Appointment type: Return  Provider: Dr. Chan  Return date: October 2024  Specialty phone number: [phone]

## 2023-11-14 ENCOUNTER — OFFICE VISIT (OUTPATIENT)
Dept: FAMILY MEDICINE | Facility: CLINIC | Age: 28
End: 2023-11-14
Payer: COMMERCIAL

## 2023-11-14 VITALS
OXYGEN SATURATION: 96 % | BODY MASS INDEX: 19.97 KG/M2 | WEIGHT: 117 LBS | HEIGHT: 64 IN | HEART RATE: 88 BPM | TEMPERATURE: 97.2 F | SYSTOLIC BLOOD PRESSURE: 124 MMHG | DIASTOLIC BLOOD PRESSURE: 70 MMHG | RESPIRATION RATE: 19 BRPM

## 2023-11-14 DIAGNOSIS — Z00.00 ROUTINE GENERAL MEDICAL EXAMINATION AT A HEALTH CARE FACILITY: Primary | ICD-10-CM

## 2023-11-14 PROCEDURE — 99395 PREV VISIT EST AGE 18-39: CPT | Performed by: PHYSICIAN ASSISTANT

## 2023-11-14 ASSESSMENT — ENCOUNTER SYMPTOMS
CONSTIPATION: 0
CHILLS: 0
HEARTBURN: 0
SORE THROAT: 0
HEADACHES: 0
DYSURIA: 0
HEMATOCHEZIA: 0
EYE PAIN: 0
WEAKNESS: 0
NAUSEA: 0
PARESTHESIAS: 0
NERVOUS/ANXIOUS: 1
PALPITATIONS: 0
HEMATURIA: 0
MYALGIAS: 0
FREQUENCY: 0
BREAST MASS: 0
ABDOMINAL PAIN: 0
FEVER: 0
JOINT SWELLING: 0
COUGH: 0
SHORTNESS OF BREATH: 0
DIARRHEA: 0
ARTHRALGIAS: 0
DIZZINESS: 0

## 2023-11-14 ASSESSMENT — PAIN SCALES - GENERAL: PAINLEVEL: NO PAIN (0)

## 2023-11-14 NOTE — PROGRESS NOTES
SUBJECTIVE:   CC: Najma is an 28 year old who presents for preventive health visit.       11/14/2023     8:46 AM   Additional Questions   Roomed by loretta isbell   Accompanied by emily         11/14/2023     8:46 AM   Patient Reported Additional Medications   Patient reports taking the following new medications n/a       Healthy Habits:     Getting at least 3 servings of Calcium per day:  NO    Bi-annual eye exam:  Yes    Dental care twice a year:  Yes    Sleep apnea or symptoms of sleep apnea:  None    Diet:  Vegetarian/vegan    Frequency of exercise:  2-3 days/week    Duration of exercise:  30-45 minutes    Taking medications regularly:  Yes    Medication side effects:  None    Additional concerns today:  No    Patient [facility]AP[facility] at [facility], already scheduled.      Today's PHQ-2 Score:       11/13/2023     7:40 AM   PHQ-2 ( 1999 Pfizer)   Q1: Little interest or pleasure in doing things 1   Q2: Feeling down, depressed or hopeless 1   PHQ-2 Score 2   Q1: Little interest or pleasure in doing things Several days   Q2: Feeling down, depressed or hopeless Several days   PHQ-2 Score 2       Social History     Tobacco Use    Smoking status: Never    Smokeless tobacco: Never   Substance Use Topics    Alcohol use: No             11/13/2023     7:39 AM   Alcohol Use   Prescreen: >3 drinks/day or >7 drinks/week? No     Reviewed orders with patient.  Reviewed health maintenance and updated orders accordingly - Yes  Labs reviewed in EPIC  BP Readings from Last 3 Encounters:   11/14/23 124/70   10/30/23 122/77   05/18/22 112/62    Wt Readings from Last 3 Encounters:   11/14/23 53.1 kg (117 lb)   05/05/22 52.3 kg (115 lb 6.4 oz)   03/09/21 55.8 kg (123 lb)                  Patient Active Problem List   Diagnosis    Acne    Pituitary dwarfism (H24)     Past Surgical History:   Procedure Laterality Date    EYE SURGERY  Winter 2019    Lasik       Social History     Tobacco Use    Smoking status: Never    Smokeless tobacco:  Never   Substance Use Topics    Alcohol use: No     Family History   Problem Relation Age of Onset    Skin Cancer Maternal Grandfather     Other Cancer Paternal Grandfather     Diabetes Other         Raad    Diabetes Other         Dad, unle i think, grandma    Depression Brother     Unknown/Adopted Brother          Current Outpatient Medications   Medication Sig Dispense Refill    adapalene (DIFFERIN) 0.1 % external cream Apply topically at bedtime Pea-sized amount to whole face 45 g 3    benzoyl peroxide 5 % external liquid Apply topically daily 226 g 11    Multiple Vitamin (MULTIVITAMIN OR) Take  by mouth daily.      spironolactone (ALDACTONE) 50 MG tablet Take 2 tablets (100 mg) by mouth at bedtime 60 tablet 0     No Known Allergies  Recent Labs   Lab Test 22  1520   ALT 21   CR 0.61   GFRESTIMATED >90   POTASSIUM 3.7   TSH 0.95        Breast Cancer Screenin/5/2022     2:38 PM   Breast CA Risk Assessment (FHS-7)   Do you have a family history of breast, colon, or ovarian cancer? No / Unknown       Patient under 40 years of age: Routine Mammogram Screening not recommended.   Pertinent mammograms are reviewed under the imaging tab.    History of abnormal Pap smear: NO - age 21-29 PAP every 3 years recommended     Reviewed and updated as needed this visit by clinical staff                  Reviewed and updated as needed this visit by Provider                   History reviewed. No pertinent past medical history.   Past Surgical History:   Procedure Laterality Date    EYE SURGERY  Winter 2019    Lasik       Review of Systems   Constitutional:  Negative for chills and fever.   HENT:  Negative for congestion, ear pain, hearing loss and sore throat.    Eyes:  Negative for pain and visual disturbance.   Respiratory:  Negative for cough and shortness of breath.    Cardiovascular:  Negative for chest pain, palpitations and peripheral edema.   Gastrointestinal:  Negative for abdominal pain, constipation,  "diarrhea, heartburn, hematochezia and nausea.   Breasts:  Negative for tenderness, breast mass and discharge.   Genitourinary:  Positive for vaginal discharge. Negative for dysuria, frequency, genital sores, hematuria, pelvic pain, urgency and vaginal bleeding.   Musculoskeletal:  Negative for arthralgias, joint swelling and myalgias.   Skin:  Negative for rash.   Neurological:  Negative for dizziness, weakness, headaches and paresthesias.   Psychiatric/Behavioral:  Negative for mood changes. The patient is nervous/anxious.       OBJECTIVE:   There were no vitals taken for this visit.  Physical Exam  GENERAL: healthy, alert and no distress  EYES: Eyes grossly normal to inspection, PERRL and conjunctivae and sclerae normal  HENT: ear canals and TM's normal, nose and mouth without ulcers or lesions  NECK: no adenopathy, no asymmetry, masses, or scars and thyroid normal to palpation  RESP: lungs clear to auscultation - no rales, rhonchi or wheezes  CV: regular rate and rhythm, normal S1 S2, no S3 or S4, no murmur, click or rub, no peripheral edema and peripheral pulses strong  MS: no gross musculoskeletal defects noted, no edema  SKIN: no suspicious lesions or rashes  NEURO: Normal strength and tone, mentation intact and speech normal  PSYCH: mentation appears normal, affect normal/bright    Diagnostic Test Results:  Labs reviewed in Epic    ASSESSMENT/PLAN:       ICD-10-CM    1. Routine general medical examination at a health care facility  Z00.00           Patient has been advised of split billing requirements and indicates understanding: Yes      COUNSELING:  Reviewed preventive health counseling, as reflected in patient instructions       Regular exercise       Healthy diet/nutrition        She reports that she has never smoked. She has never used smokeless tobacco.              Mindi \"Erick\" ABNER Olson   [facility] [place]  "

## 2023-12-16 ENCOUNTER — MYC REFILL (OUTPATIENT)
Dept: DERMATOLOGY | Facility: CLINIC | Age: 28
End: 2023-12-16
Payer: COMMERCIAL

## 2023-12-16 DIAGNOSIS — L70.0 ACNE VULGARIS: ICD-10-CM

## 2023-12-18 RX ORDER — SPIRONOLACTONE 50 MG/1
100 TABLET, FILM COATED ORAL AT BEDTIME
Qty: 60 TABLET | Refills: 11 | Status: SHIPPED | OUTPATIENT
Start: 2023-12-18

## 2023-12-18 NOTE — TELEPHONE ENCOUNTER
10/30/2023 Assessment & Plan:      1. Acne vulgaris: overall doing well on spironolactone 100 mg at bedtime, adapalene two times a week, and benzoyl peroxide two times a week, still getting a 1-2 new pimples in the chin area. Discussed gradually increasing frequency of adapalene to every other day, and alternating with Benzoyl peroxide to avoid dryness.   - Continue spironolactone 100 mg at bedtime, refilled today   - Recommend increasing adapalene 0.1% every other day increase as tolerated  - Recommend benzoyl peroxide 5% wash every other day, alternating the days with the adapalene to avoid further dryness.      2. Irritated Dermal Nevi x 3 on the Mons pubis. Counseled that these lesions are benign, can become irritated given the area.   -Will continue to monitor.

## 2024-06-25 SDOH — HEALTH STABILITY: PHYSICAL HEALTH: ON AVERAGE, HOW MANY DAYS PER WEEK DO YOU ENGAGE IN MODERATE TO STRENUOUS EXERCISE (LIKE A BRISK WALK)?: 2 DAYS

## 2024-06-25 SDOH — HEALTH STABILITY: PHYSICAL HEALTH: ON AVERAGE, HOW MANY MINUTES DO YOU ENGAGE IN EXERCISE AT THIS LEVEL?: 60 MIN

## 2024-06-25 ASSESSMENT — SOCIAL DETERMINANTS OF HEALTH (SDOH): HOW OFTEN DO YOU GET TOGETHER WITH FRIENDS OR RELATIVES?: THREE TIMES A WEEK

## 2024-06-27 ENCOUNTER — OFFICE VISIT (OUTPATIENT)
Dept: FAMILY MEDICINE | Facility: CLINIC | Age: 29
End: 2024-06-27
Payer: COMMERCIAL

## 2024-06-27 VITALS
BODY MASS INDEX: 20.71 KG/M2 | SYSTOLIC BLOOD PRESSURE: 113 MMHG | RESPIRATION RATE: 16 BRPM | OXYGEN SATURATION: 100 % | DIASTOLIC BLOOD PRESSURE: 71 MMHG | TEMPERATURE: 97.2 F | WEIGHT: 121.3 LBS | HEIGHT: 64 IN | HEART RATE: 73 BPM

## 2024-06-27 DIAGNOSIS — Z00.00 ROUTINE GENERAL MEDICAL EXAMINATION AT A HEALTH CARE FACILITY: Primary | ICD-10-CM

## 2024-06-27 DIAGNOSIS — Z11.3 SCREEN FOR STD (SEXUALLY TRANSMITTED DISEASE): ICD-10-CM

## 2024-06-27 PROCEDURE — 99395 PREV VISIT EST AGE 18-39: CPT | Performed by: PHYSICIAN ASSISTANT

## 2024-06-27 PROCEDURE — 87591 N.GONORRHOEAE DNA AMP PROB: CPT | Performed by: PHYSICIAN ASSISTANT

## 2024-06-27 PROCEDURE — 87491 CHLMYD TRACH DNA AMP PROBE: CPT | Performed by: PHYSICIAN ASSISTANT

## 2024-06-27 ASSESSMENT — PAIN SCALES - GENERAL: PAINLEVEL: NO PAIN (0)

## 2024-06-27 NOTE — PATIENT INSTRUCTIONS
"Patient Education   Preventive Care Advice   This is general advice we often give to help people stay healthy. Your care team may have specific advice just for you. Please talk to your care team about your own preventive care needs.  Lifestyle  Exercise at least 150 minutes each week (30 minutes a day, 5 days a week).  Do muscle strengthening activities 2 days a week. These help control your weight and prevent disease.  No smoking.  Wear sunscreen to prevent skin cancer.  Have your home tested for radon every 2 to 5 years. Radon is a colorless, odorless gas that can harm your lungs. To learn more, go to www.health.[facility].mn.us and search for \"Radon in Homes.\"  Keep guns unloaded and locked up in a safe place like a safe or gun vault, or, use a gun lock and hide the keys. Always lock away bullets separately. To learn more, visit [employer].gov and search for \"safe gun storage.\"  Nutrition  Eat 5 or more servings of fruits and vegetables each day.  Try wheat bread, brown rice and whole grain pasta (instead of white bread, rice, and pasta).  Get enough calcium and vitamin D. Check the label on foods and aim for 100% of the RDA (recommended daily allowance).  Regular exams  Have a dental exam and cleaning every 6 months.  See your health care team every year to talk about:  Any changes in your health.  Any medicines your care team has prescribed.  Preventive care, family planning, and ways to prevent chronic diseases.  Shots (vaccines)   HPV shots (up to age 26), if you've never had them before.  Hepatitis B shots (up to age 59), if you've never had them before.  COVID-19 shot: Get this shot when it's due.  Flu shot: Get a flu shot every year.  Tetanus shot: Get a tetanus shot every 10 years.  Pneumococcal, hepatitis A, and RSV shots: Ask your care team if you need these based on your risk.  Shingles shot (for age 50 and up).  General health tests  Diabetes screening:  Starting at age 35, Get screened for diabetes at least " - Continue OI prophylaxis per protocol  - Hold Bactrim d/t hyperkalemia     every 3 years.  If you are younger than age 35, ask your care team if you should be screened for diabetes.  Cholesterol test: At age 39, start having a cholesterol test every 5 years, or more often if advised.  Bone density scan (DEXA): At age 50, ask your care team if you should have this scan for osteoporosis (brittle bones).  Hepatitis C: Get tested at least once in your life.  Abdominal aortic aneurysm screening: Talk to your doctor about having this screening if you:  Have ever smoked; and  Are biologically male; and  Are between the ages of 65 and 75.  STIs (sexually transmitted infections)  Before age 24: Ask your care team if you should be screened for STIs.  After age 24: Get screened for STIs if you're at risk. You are at risk for STIs (including HIV) if:  You are sexually active with more than one person.  You don't use condoms every time.  You or a partner was diagnosed with a sexually transmitted infection.  If you are at risk for HIV, ask about PrEP medicine to prevent HIV.  Get tested for HIV at least once in your life, whether you are at risk for HIV or not.  Cancer screening tests  Cervical cancer screening: If you have a cervix, begin getting regular cervical cancer screening tests at age 21. Most people who have regular screenings with normal results can stop after age 65. Talk about this with your provider.  Breast cancer scan (mammogram): If you've ever had breasts, begin having regular mammograms starting at age 40. This is a scan to check for breast cancer.  Colon cancer screening: It is important to start screening for colon cancer at age 45.  Have a colonoscopy test every 10 years (or more often if you're at risk) Or, ask your provider about stool tests like a FIT test every year or Cologuard test every 3 years.  To learn more about your testing options, visit: www.[employer]/729135.pdf.  For help making a decision, visit: lety/hv33354.  Prostate cancer screening test: If you have a  prostate and are age 55 to 69, ask your provider if you would benefit from a yearly prostate cancer screening test.  Lung cancer screening: If you are a current or former smoker age 50 to 80, ask your care team if ongoing lung cancer screenings are right for you.  For informational purposes only. Not to replace the advice of your health care provider. Copyright   2023 [facility]. All rights reserved. Clinically reviewed by the  [facility] [place] [facility]. DialedIN 950754 - REV 04/24.  Substance Use Disorder: Care Instructions  Overview     You can improve your life and health by stopping your use of alcohol or drugs. When you don't drink or use drugs, you may feel and sleep better. You may get along better with your family, friends, and coworkers. There are medicines and programs that can help with substance use disorder.  How can you care for yourself at home?  Here are some ways to help you stay sober and prevent relapse.  If you have been given medicine to help keep you sober or reduce your cravings, be sure to take it exactly as prescribed.  Talk to your doctor about programs that can help you stop using drugs or drinking alcohol.  Do not keep alcohol or drugs in your home.  Plan ahead. Think about what you'll say if other people ask you to drink or use drugs. Try not to spend time with people who drink or use drugs.  Use the time and money spent on drinking or drugs to do something that's important to you.  Preventing a relapse  Have a plan to deal with relapse. Learn to recognize changes in your thinking that lead you to drink or use drugs. Get help before you start to drink or use drugs again.  Try to stay away from situations, friends, or places that may lead you to drink or use drugs.  If you feel the need to drink alcohol or use drugs again, seek help right away. Call a trusted friend or family member. Some people get support from organizations such as Narcotics Anonymous or [employer]  [employer] or from treatment facilities.  If you relapse, get help as soon as you can. Some people make a plan with another person that outlines what they want that person to do for them if they relapse. The plan usually includes how to handle the relapse and who to notify in case of relapse.  Don't give up. Remember that a relapse doesn't mean that you have failed. Use the experience to learn the triggers that lead you to drink or use drugs. Then quit again. Recovery is a lifelong process. Many people have several relapses before they are able to quit for good.  Follow-up care is a key part of your treatment and safety. Be sure to make and go to all appointments, and call your doctor if you are having problems. It's also a good idea to know your test results and keep a list of the medicines you take.  When should you call for help?   Call 911  anytime you think you may need emergency care. For example, call if you or someone else:    Has overdosed or has withdrawal signs. Be sure to tell the emergency workers that you are or someone else is using or trying to quit using drugs. Overdose or withdrawal signs may include:  Losing consciousness.  Seizure.  Seeing or hearing things that aren't there (hallucinations).     Is thinking or talking about suicide or harming others.   Where to get help 24 hours a day, 7 days a week   If you or someone you know talks about suicide, self-harm, a mental health crisis, a substance use crisis, or any other kind of emotional distress, get help right away. You can:    Call the Suicide and Crisis Lifeline at 988.     Call 1-681-702-TALK (1-766.555.9049).     Text HOME to 039314 to access the Crisis Text Line.   Consider saving these numbers in your phone.  Go to [employer] for more information or to chat online.  Call your doctor now or seek immediate medical care if:    You are having withdrawal symptoms. These may include nausea or vomiting, sweating, shakiness, and anxiety.  "  Watch closely for changes in your health, and be sure to contact your doctor if:    You have a relapse.     You need more help or support to stop.   Where can you learn more?  Go to https://www.[employer].net/patiented  Enter H573 in the search box to learn more about \"Substance Use Disorder: Care Instructions.\"  Current as of: November 15, 2023               Content Version: 14.0    8444-7554 [employer].   Care instructions adapted under license by your healthcare professional. If you have questions about a medical condition or this instruction, always ask your healthcare professional. [employer] disclaims any warranty or liability for your use of this information.         "

## 2024-06-27 NOTE — PROGRESS NOTES
Pre[facility] Care Visit  [facility]  Jun 27, 2024      Assessment & Plan   Problem List Items Addressed This Visit    None  Visit Diagnoses       Routine general medical examination at a health care facility    -  Primary    Screen for STD (sexually transmitted disease)        Relevant Orders    Chlamydia trachomatis/Neisseria gonorrhoeae by PCR             Patient has been advised of split billing requirements and indicates understanding: Yes       Counseling  Appropriate preventive services were discussed with this patient, including applicable screening as appropriate for fall prevention, nutrition, physical activity, Tobacco-use cessation, weight loss and cognition.  Checklist reviewing preventive services available has been given to the patient.  Reviewed patient's diet, addressing concerns and/or questions.   She is at risk for lack of exercise and has been provided with information to increase physical activity for the benefit of her well-being.     See Patient Instructions      Chico Yao is a 29 year old, presenting for the following:  Physical (Pt is not fasting)        6/27/2024     7:19 AM   Additional Questions   Roomed by MANSI Zelaya   Accompanied by n/a        Via the Health Maintenance questionnaire, the patient has reported the following services have been completed   -Cervical Cancer Screening: [facility] 2023-04-23, this information has been sent to the abstraction team.    Health Care Directive  Patient does not have a Health Care Directive or Living Will: Discussed advance care planning with patient; however, patient declined at this time.    HPI        6/25/2024   General Health   How would you rate your overall physical health? Good   Feel stress (tense, anxious, or unable to sleep) To some extent      (!) STRESS CONCERN      6/25/2024   Nutrition   Three or more servings of calcium each day? (!) NO    Diet: Vegetarian/vegan   How many servings of fruit and vegetables per day? (!) 2-3   How many sweetened beverages each day? 0-1            6/25/2024   Exercise   Days per week of moderate/strenous exercise 2 days   Average minutes spent exercising at this level 60 min      (!) EXERCISE CONCERN      6/25/2024   Social Factors   Frequency of gathering with friends or relatives Three times a week   Worry food won't last until get money to buy more No   Food not last or not have enough money for food? No   Do you have housing? (Housing is defined as stable permanent housing and does not include staying ouside in a car, in a tent, in an abandoned building, in an overnight shelter, or couch-surfing.) Yes   Are you worried about losing your housing? No   Lack of transportation? No   Unable to get utilities (heat,electricity)? No            6/25/2024   Dental   Dentist two times every year? Yes            6/25/2024   TB Screening   Were you born outside of the [place]? No            Today's PHQ-2 Score:       6/27/2024     7:20 AM   PHQ-2 ( 1999 Pfizer)   Q1: Little interest or pleasure in doing things 0   Q2: Feeling down, depressed or hopeless 1   PHQ-2 Score 1   Q1: Little interest or pleasure in doing things Not at all   Q2: Feeling down, depressed or hopeless Several days   PHQ-2 Score 1           6/25/2024   Substance Use   Alcohol more than 3/day or more than 7/wk No   Do you use any other substances recreationally? (!) ALCOHOL        Social History     Tobacco Use    Smoking status: Never    Smokeless tobacco: Never   Vaping Use    Vaping status: Never Used   Substance Use Topics    Alcohol use: No    Drug use: No          Mammogram Screening - Patient under 40 years of age: Routine Mammogram Screening not recommended.         6/25/2024   STI Screening   New sexual partner(s) since last STI/HIV test? No        History of abnormal Pap smear: No - age 21-29 PAP every 3 years recommended             6/25/2024    Contraception/Family Planning   Questions about contraception or family planning (!) YES             Reviewed and updated as needed this visit by Provider   Tobacco  Allergies  Meds  Problems  Med Hx  Surg Hx  Fam Hx            History reviewed. No pertinent past medical history.  Past Surgical History:   Procedure Laterality Date    EYE SURGERY  Winter 2019    Lasik     Labs reviewed in EPIC  BP Readings from Last 3 Encounters:   06/27/24 113/71   11/14/23 124/70   10/30/23 122/77    Wt Readings from Last 3 Encounters:   06/27/24 55 kg (121 lb 4.8 oz)   11/14/23 53.1 kg (117 lb)   05/05/22 52.3 kg (115 lb 6.4 oz)                  Patient Active Problem List   Diagnosis    Acne    Pituitary dwarfism (H24)     Past Surgical History:   Procedure Laterality Date    EYE SURGERY  Winter 2019    Lasik       Social History     Tobacco Use    Smoking status: Never    Smokeless tobacco: Never   Substance Use Topics    Alcohol use: No     Family History   Problem Relation Age of Onset    Skin Cancer Maternal Grandfather     Other Cancer Paternal Grandfather         Skin and pancreatic i believe    Diabetes Other         Raad    Obesity Other     Diabetes Other         Dad, unle i think, grandma    Anxiety Disorder Other         Cousins, brother, dad, myself    Depression Brother     Unknown/Adopted Brother     Asthma Brother     Hyperlipidemia Father     Obesity Other          Current Outpatient Medications   Medication Sig Dispense Refill    adapalene (DIFFERIN) 0.1 % external cream Apply topically at bedtime Pea-sized amount to whole face 45 g 3    benzoyl peroxide 5 % external liquid Apply topically daily 226 g 11    Multiple Vitamin (MULTIVITAMIN OR) Take  by mouth daily.      spironolactone (ALDACTONE) 50 MG tablet Take 2 tablets (100 mg) by mouth at bedtime 60 tablet 11     No Known Allergies  Recent Labs   Lab Test 05/05/22  1520   ALT 21   CR 0.61   GFRESTIMATED >90   POTASSIUM 3.7   TSH 0.95          Review  "of Systems  Constitutional, neuro, ENT, endocrine, pulmonary, cardiac, gastrointestinal, genitourinary, musculoskeletal, integument and psychiatric systems are negative, except as otherwise noted.     Objective    Exam  /71 (BP Location: Right arm, Patient Position: Sitting, Cuff Size: Adult Regular)   Pulse 73   Temp 97.2  F (36.2  C) (Temporal)   Resp 16   Ht 1.62 m (5' 3.78\")   Wt 55 kg (121 lb 4.8 oz)   SpO2 100%   BMI 20.96 kg/m     Estimated body mass index is 20.96 kg/m  as calculated from the following:    Height as of this encounter: 1.62 m (5' 3.78\").    Weight as of this encounter: 55 kg (121 lb 4.8 oz).    Physical Exam  GENERAL: alert and no distress  EYES: Eyes grossly normal to inspection, PERRL and conjunctivae and sclerae normal  HENT: ear canals and TM's normal, nose and mouth without ulcers or lesions  NECK: no adenopathy, no asymmetry, masses, or scars  RESP: lungs clear to auscultation - no rales, rhonchi or wheezes  CV: regular rate and rhythm, normal S1 S2, no S3 or S4, no murmur, click or rub, no peripheral edema  MS: no gross musculoskeletal defects noted, no edema  SKIN: no suspicious lesions or rashes  NEURO: Normal strength and tone, mentation intact and speech normal  PSYCH: mentation appears normal, affect normal/bright        Signed Electronically by: Erick Olson PA-C    "

## 2024-06-28 LAB
C TRACH DNA SPEC QL PROBE+SIG AMP: NEGATIVE
N GONORRHOEA DNA SPEC QL NAA+PROBE: NEGATIVE

## 2024-06-28 NOTE — RESULT ENCOUNTER NOTE
"Kaylen Yao  Your attached labs are negative for gonorrhea and chlamydia.  Please contact the office with any questions or concerns.    Mindi Campos \"Erick\" ABNER Olson    "

## 2024-11-04 ENCOUNTER — OFFICE VISIT (OUTPATIENT)
Dept: DERMATOLOGY | Facility: CLINIC | Age: 29
End: 2024-11-04
Attending: DERMATOLOGY
Payer: COMMERCIAL

## 2024-11-04 DIAGNOSIS — L70.0 ACNE VULGARIS: ICD-10-CM

## 2024-11-04 DIAGNOSIS — D22.9 MULTIPLE NEVI: Primary | ICD-10-CM

## 2024-11-04 PROCEDURE — 99214 OFFICE O/P EST MOD 30 MIN: CPT | Performed by: DERMATOLOGY

## 2024-11-04 RX ORDER — SPIRONOLACTONE 50 MG/1
100 TABLET, FILM COATED ORAL AT BEDTIME
Qty: 60 TABLET | Refills: 11 | Status: SHIPPED | OUTPATIENT
Start: 2024-11-04

## 2024-11-04 ASSESSMENT — PAIN SCALES - GENERAL: PAINLEVEL_OUTOF10: NO PAIN (0)

## 2024-11-04 NOTE — LETTER
"11/4/2024       RE: Najma Rodríguez  [address]  [facility] [address]     Dear Colleague,    Thank you for referring your patient, Najma Rodríguez, to the [facility] DERMATOLOGY CLINIC [place] at [facility]. Please see a copy of my visit note below.    [facility] Dermatology Note  Encounter Date: Nov 4, 2024  Office Visit     Dermatology Problem List:  Acne vulgaris: mixed inflammatory/comedonal with hormonal pattern   - spironolactone 100 mg at bedtime, adapalene 0.1% cream (as tolerated), benzoyl peroxide 5% wash  - has hormonal IUD, considering birth control pills in years time.   - past treatment: spironolactone 50 mg at bedtime (ineffective), tretinoin 0.025% cream (too irritating)    2. Benign nevi, traumatized   - hyperpigmented verrucous plaques   - has had for years and have become \"more rough and irritated\"   - Patient considering shave removal vs. Excision     ____________________________________________    Assessment & Plan:    # Acne vulgaris.   - Continue spironolactone 100 mg at bedtime   - Discussed incorporating adapalene as tolerated and using sandwich method (moisturizer, adapalene, moisturizer) if too irritating  - Continue benzoyl peroxide 5% wash as needed    # Irritated Dermal Nevi x 3 on the mons pubis  - Counseled that these lesions are benign but can grow with more friction   - Given option for shave removal or excision removal   - Will continue to monitor.  Patient will send message if wishes removal    Procedures Performed:   None     Follow-up: 1 year(s) in-person, or earlier for new or changing lesions    Staff and Medical Student:     Francia Maurice, MS4   Advanced Dermatology Medical Student      I was present with the medical student who participated in the service and in the documentation of the note. I have verified the history and personally performed the physical exam and medical " decision making. I agree with the assessment and plan of care as documented in the note.  Tatiana Chan MD   ____________________________________________    CC: Acne and Derm Problem (Has an area of concern in the groin. No pain but there is itchiness. )    HPI:  Ms. Najma Rodríguez is a(n) 29 year old female who presents today as return patient for acne vulgaris. She has been treated with spironolactone for management of hormonal acne and tolerates it well. She ran out of pill about a week ago and noticed a breakout that resolved after taking pill again. She has not used adapalene for 1 year due to feeling her skin was too dry. She does not use the BPO wash regularly due to similar issue with dryness but uses as needed or when she remembers. She is currently using a hormonal IUD but discussed removing in the next year and potentially switching to birth control pills, which she felt her acne was better controlled on.     Patient also complains of 3 spots on groin that she has had for several years but have noticed them growing and getting darker. On past assessment she was told that they are benign but could get irritated so wanted to know about options.    Patient is otherwise feeling well, without additional skin concerns.    Labs:  None    Physical Exam:  Vitals: There were no vitals taken for this visit.  SKIN: Focused face and groin examined.  - Lower face and chin has few inflammatory papules and closed comedones, but rest of face clear.   - Mons pubs has 3 pigmented homogenous papules with verrucous surface consistent with traumatization. Dermoscopy shows no atypical pigment but thickened surface with SK like features. History of patient and timeline of lesions favor benign nevi.  - No other lesions of concern on areas examined.     Medications:  Current Outpatient Medications   Medication Sig Dispense Refill     benzoyl peroxide 5 % external liquid Apply topically daily 226 g 11     Multiple  Vitamin (MULTIVITAMIN OR) Take  by mouth daily.       spironolactone (ALDACTONE) 50 MG tablet Take 2 tablets (100 mg) by mouth at bedtime. 60 tablet 11     adapalene (DIFFERIN) 0.1 % external cream Apply topically at bedtime Pea-sized amount to whole face (Patient not taking: Reported on 11/4/2024) 45 g 3     No current facility-administered medications for this visit.      Past Medical History:   Patient Active Problem List   Diagnosis     Acne     Pituitary dwarfism (H)     History reviewed. No pertinent past medical history.     CC Tatiana Chan MD  [address] [place] [address] [address]  [place] [address] on close of this encounter.      Again, thank you for allowing me to participate in the care of your patient.      Sincerely,    Tatiana Chan MD

## 2024-11-04 NOTE — NURSING NOTE
Dermatology Rooming Note    Najma Rodríguez's goals for this visit include:   Chief Complaint   Patient presents with    Acne    Derm Problem     Has an area of concern in the groin. No pain but there is itchiness.      Guilherme Cheung, EMT  Clinic Support  [facility]     [phone]    Employed by [employer]

## 2024-11-04 NOTE — PROGRESS NOTES
"[facility] Dermatology Note  Encounter Date: Nov 4, 2024  Office Visit     Dermatology Problem List:  Acne vulgaris: mixed inflammatory/comedonal with hormonal pattern   - spironolactone 100 mg at bedtime, adapalene 0.1% cream (as tolerated), benzoyl peroxide 5% wash  - has hormonal IUD, considering birth control pills in years time.   - past treatment: spironolactone 50 mg at bedtime (ineffective), tretinoin 0.025% cream (too irritating)    2. Benign nevi, traumatized   - hyperpigmented verrucous plaques   - has had for years and have become \"more rough and irritated\"   - Patient considering shave removal vs. Excision     ____________________________________________    Assessment & Plan:    # Acne vulgaris.   - Continue spironolactone 100 mg at bedtime   - Discussed incorporating adapalene as tolerated and using sandwich method (moisturizer, adapalene, moisturizer) if too irritating  - Continue benzoyl peroxide 5% wash as needed    # Irritated Dermal Nevi x 3 on the mons pubis  - Counseled that these lesions are benign but can grow with more friction   - Given option for shave removal or excision removal   - Will continue to monitor.  Patient will send message if wishes removal    Procedures Performed:   None     Follow-up: 1 year(s) in-person, or earlier for new or changing lesions    Staff and Medical Student:     Francia Maurice, MS4   Advanced Dermatology Medical Student      I was present with the medical student who participated in the service and in the documentation of the note. I have verified the history and personally performed the physical exam and medical decision making. I agree with the assessment and plan of care as documented in the note.  Tatiana Chan MD   ____________________________________________    CC: Acne and Derm Problem (Has an area of concern in the groin. No pain but there is itchiness. )    HPI:  Ms. Najma Rodríguez is a(n) 29 year old female who presents " today as return patient for acne vulgaris. She has been treated with spironolactone for management of hormonal acne and tolerates it well. She ran out of pill about a week ago and noticed a breakout that resolved after taking pill again. She has not used adapalene for 1 year due to feeling her skin was too dry. She does not use the BPO wash regularly due to similar issue with dryness but uses as needed or when she remembers. She is currently using a hormonal IUD but discussed removing in the next year and potentially switching to birth control pills, which she felt her acne was better controlled on.     Patient also complains of 3 spots on groin that she has had for several years but have noticed them growing and getting darker. On past assessment she was told that they are benign but could get irritated so wanted to know about options.    Patient is otherwise feeling well, without additional skin concerns.    Labs:  None    Physical Exam:  Vitals: There were no vitals taken for this visit.  SKIN: Focused face and groin examined.  - Lower face and chin has few inflammatory papules and closed comedones, but rest of face clear.   - Mons pubs has 3 pigmented homogenous papules with verrucous surface consistent with traumatization. Dermoscopy shows no atypical pigment but thickened surface with SK like features. History of patient and timeline of lesions favor benign nevi.  - No other lesions of concern on areas examined.     Medications:  Current Outpatient Medications   Medication Sig Dispense Refill    benzoyl peroxide 5 % external liquid Apply topically daily 226 g 11    Multiple Vitamin (MULTIVITAMIN OR) Take  by mouth daily.      spironolactone (ALDACTONE) 50 MG tablet Take 2 tablets (100 mg) by mouth at bedtime. 60 tablet 11    adapalene (DIFFERIN) 0.1 % external cream Apply topically at bedtime Pea-sized amount to whole face (Patient not taking: Reported on 11/4/2024) 45 g 3     No current facility-administered  medications for this visit.      Past Medical History:   Patient Active Problem List   Diagnosis    Acne    Pituitary dwarfism (H)     History reviewed. No pertinent past medical history.     CC Tatiana Chan MD  [address] [place] [address] [address]  [place] [address] on close of this encounter.

## 2025-01-27 ENCOUNTER — TRANSFERRED RECORDS (OUTPATIENT)
Dept: HEALTH INFORMATION MANAGEMENT | Facility: CLINIC | Age: 30
End: 2025-01-27
Payer: COMMERCIAL

## 2025-01-29 ENCOUNTER — OFFICE VISIT (OUTPATIENT)
Dept: DERMATOLOGY | Facility: CLINIC | Age: 30
End: 2025-01-29
Payer: COMMERCIAL

## 2025-01-29 VITALS — DIASTOLIC BLOOD PRESSURE: 94 MMHG | SYSTOLIC BLOOD PRESSURE: 136 MMHG | RESPIRATION RATE: 14 BRPM | HEART RATE: 90 BPM

## 2025-01-29 DIAGNOSIS — R21 RASH: Primary | ICD-10-CM

## 2025-01-29 DIAGNOSIS — L23.7 ACUTE PHYTOPHOTODERMATITIS: ICD-10-CM

## 2025-01-29 ASSESSMENT — PAIN SCALES - GENERAL: PAINLEVEL_OUTOF10: NO PAIN (0)

## 2025-01-29 NOTE — PATIENT INSTRUCTIONS
Continue triamcinolone 0.1% cream twice daily for 2-3 weeks. This is not contagious.          Gentle Skin Care    Below is a list of products our providers recommend for gentle skin care.    Moisturizers:  Lighter: Exederm Intensive Moisture Cream, Cetaphil Cream, CeraVe, Aveeno Positively Radiant and Vanicream Light   Thicker: Aquaphor Ointment, Vaseline, Petroleum Jelly, Eucerin Original Healing Cream, Vanicream Cream, CeraVe Healing Ointment, Aquaphor Body Spray  Avoid Lotions (too thin)  Mild Cleansers:  Dove Fragrance-free Bar or Wash  CeraVe   Vanicream Cleansing Bar  Cetaphil Cleanser   Aquaphor 2-in-1 Gentle Wash and Shampoo  Dove Baby Wash  Exederm Body Wash       Laundry Products:  All Free and Clear Products  Cheer Free  Generic brands are okay as long as they are fragrance-free  Avoid fabric softeners and dryer sheets   Sunscreens: SPF 30 or greater   Sunscreens that contain zinc oxide and/or titanium dioxide should be applied. These are physical blockers. One or both of these should be listed in the active Ingredients.  Any other listed ingredients under the active ingredients would be a chemically-based sunscreen, which might be irritating.  Spray sunscreens should be avoided because these are typically chemical sunscreens.      Shampoo and Conditioners:  Free and Clear by Vanicream  Aquaphor 2-in-1 Gentle Wash and Shampoo   Oils:  Mineral Oil   Emu Oil   For some patients: coconut (raw, unrefined, organic) and sunflower seed oil      Keep in mind:  Generic products are an okay substitute, but make sure they are fragrance-free.  Reading the product ingredients list is very important.  Avoid product that have fragrance added to them.   Organic does not mean fragrance-free. In fact, patients with sensitive skin can become quite irritated by some organic products.     Recommendations:  Daily bathing. Make sure you are applying a good moisturizer after bathing every time.  Apply moisturizing creams at  least twice daily to the whole body. Your provider may recommend a lighter or heavier moisturizer based on the severity of your skin condition and that time of year it is.  Creams are more moisturizing than lotions.     Care Plan:  Keep bathing and showering short, less than 15 minutes.  Always use lukewarm warm when possible. AVOID hot or cold water.  DO NOT use bubble bath.  Limit the use of soaps. Focus on skin folds, face, armpits, groin, and feet towards the end of the bath.  Do NOT vigorously scrub when you cleanse the skin.  After bathing, PAT your skin lightly with a towel. DO NOT rub or scrub when drying.  ALWAYS apply a moisturizer immediately after bathing. This helps to lock in moisture. *IF YOU WERE PRESCRIBED A TOPICAL MEDICATION, APPLY YOUR MEDICATION FIRST, AND THEN COVER WITH YOUR DAILY MOISTURIZER.*  Reapply moisturizing agents to your whole body at least twice daily.    Other helpful tips:  Do not use products such as powders, perfumes, or colognes on your skin.  Diffusers can be harsh on sensitive skin. Use with caution if you have sensitive skin.  Avoid saunas and steam baths. This temperature is too hot.  Avoid tight or scratchy clothing, such as wool.  Always wash new clothing before wearing them for the first time.  Sometimes a humidifier or vaporizer can be used at night can help the dry skin. Remember to keep these items clean to avoid mold growth.    Who should I call with questions?  [facility]: [phone]  [facility]: [phone]  For urgent needs outside of business hours call the [facility] at [phone] and ask for the dermatology resident on call.

## 2025-01-29 NOTE — LETTER
"1/29/2025       RE: Najma Rodríguez  [address]  [facility] [address]     Dear Colleague,    Thank you for referring your patient, Najma Rodríguez, to the [facility] DERMATOLOGY CLINIC [place] at [facility]. Please see a copy of my visit note below.    [facility] Dermatology Note  Encounter Date: Jan 29, 2025  Office Visit     Dermatology Problem List:  1. Acne vulgaris: mixed inflammatory/comedonal with hormonal pattern   - current tx: spironolactone 100 mg at bedtime, adapalene 0.1% cream (as tolerated), benzoyl peroxide 5% wash  - has hormonal IUD, considering birth control pills in years time.   - previous tx: spironolactone 50 mg at bedtime (ineffective), tretinoin 0.025% cream (too irritating)  2. Benign nevi, traumatized   - hyperpigmented verrucous plaques   - has had for years and have become \"more rough and irritated\"   - Patient considering shave removal vs. Excision   3. Acute Phytophotodermatitis  - current tx: Triamcinolone 0.1% cream    ____________________________________________    Assessment & Plan:    # Acute Phytophotodermatitis, reassurance given.   - continue Triamcinolone 0.1% cream for two to three weeks and reach out if it does not resolve in that timeframe or symptoms worsen  - continue gentle skin cares  - discussed this was a sun induced rash likely due to an irritant on the skin (such as citric acid) on the skin    Procedures Performed:   None     Follow-up: as scheduled with Dr. Chan on 11/04/2025, in-person, or earlier for new or changing lesions    Staff and Scribe:  Scribe Disclosure:   By signing my name below, I, Viv Batres, attest that this documentation has been prepared under the direction and in the presence of Angy Gonzalez PA-C.  - Electronically Signed: Viv Batres 01/29/25       Provider Disclosure:  I agree with above History, Review of Systems, Physical exam and " Plan.  I have reviewed the content of the documentation and have edited it as needed. I have personally performed the services documented here and the documentation accurately represents those services and the decisions I have made.      Electronically signed by:  All risk, benefits and alternatives were discussed with patient.  Patient is in agreement and understands the assessment and plan.  All questions were answered.  Sun Screen Education was given.   Return to Clinic as previously scheduled.   Angy Gonzalez PA-C          ____________________________________________    CC: Derm Problem (PT started having rashes about 4 days ago. 3 different type of rashes all over arms and chest. They have began spreading.)    HPI:  Ms. Najma Rodríguez is a(n) 29 year old female who presents today as return patient for rash.    Patient reports there is bumping that is not raised on her arm with minor spreading to chest. It is not painful. She traveled recently on a cruise to the [facility], and the rash appeared after swimming in the ocean. She notes using caution with what she ate and drank while in the [facility]. She had a sunburn on her ears that was itching, but she notes it could have also been rashing. She uses 30 SPF with occasional 50 SPF when in higher elevation. None of her sunscreens are new, and she has been using the same ones for one year.      Patient is otherwise feeling well, without additional skin concerns.    Labs:  None    Physical exam:  Vitals: BP (!) 136/94 (BP Location: Right arm, Patient Position: Chair, Cuff Size: Adult Regular)   Pulse 90   Resp 14   GEN: This is a well developed, well-nourished female in no acute distress, in a pleasant mood.    SKIN: Focused examination of the upper body was performed.  - well defined geographic reddish-tan patches on R upper chest, R upper arm and the left lower helix with epidermal peeling  - few well defined papules on L lower forearm  - No other lesions  of concern on areas examined.                             Medications:  Current Outpatient Medications   Medication Sig Dispense Refill     benzoyl peroxide 5 % external liquid Apply topically daily 226 g 11     Multiple Vitamin (MULTIVITAMIN OR) Take  by mouth daily.       spironolactone (ALDACTONE) 50 MG tablet Take 2 tablets (100 mg) by mouth at bedtime. 60 tablet 11     adapalene (DIFFERIN) 0.1 % external cream Apply topically at bedtime Pea-sized amount to whole face (Patient not taking: Reported on 1/29/2025) 45 g 3     No current facility-administered medications for this visit.      Past Medical History:   Patient Active Problem List   Diagnosis     Acne     Pituitary dwarfism     No past medical history on file.     CC Tatiana Chan MD  [address] [facility] [address]  [place] [address] on close of this encounter.      Again, thank you for allowing me to participate in the care of your patient.      Sincerely,    Angy Gonzalez PA-C

## 2025-01-29 NOTE — PROGRESS NOTES
"[facility] Dermatology Note  Encounter Date: Jan 29, 2025  Office Visit     Dermatology Problem List:  1. Acne vulgaris: mixed inflammatory/comedonal with hormonal pattern   - current tx: spironolactone 100 mg at bedtime, adapalene 0.1% cream (as tolerated), benzoyl peroxide 5% wash  - has hormonal IUD, considering birth control pills in years time.   - previous tx: spironolactone 50 mg at bedtime (ineffective), tretinoin 0.025% cream (too irritating)  2. Benign nevi, traumatized   - hyperpigmented verrucous plaques   - has had for years and have become \"more rough and irritated\"   - Patient considering shave removal vs. Excision   3. Acute Phytophotodermatitis  - current tx: Triamcinolone 0.1% cream    ____________________________________________    Assessment & Plan:    # Acute Phytophotodermatitis, reassurance given.   - continue Triamcinolone 0.1% cream for two to three weeks and reach out if it does not resolve in that timeframe or symptoms worsen  - continue gentle skin cares  - discussed this was a sun induced rash likely due to an irritant on the skin (such as citric acid) on the skin    Procedures Performed:   None     Follow-up: as scheduled with Dr. Chan on 11/04/2025, in-person, or earlier for new or changing lesions    Staff and Scribe:  Scribe Disclosure:   By signing my name below, I, Viv Batres, attest that this documentation has been prepared under the direction and in the presence of Angy Gonzalez PA-C.  - Electronically Signed: Viv Batres 01/29/25       Provider Disclosure:  I agree with above History, Review of Systems, Physical exam and Plan.  I have reviewed the content of the documentation and have edited it as needed. I have personally performed the services documented here and the documentation accurately represents those services and the decisions I have made.      Electronically signed by:  All risk, benefits and alternatives were discussed with " patient.  Patient is in agreement and understands the assessment and plan.  All questions were answered.  Sun Screen Education was given.   Return to Clinic as previously scheduled.   Angy Gonzalez PA-C          ____________________________________________    CC: Derm Problem (PT started having rashes about 4 days ago. 3 different type of rashes all over arms and chest. They have began spreading.)    HPI:  Ms. Najma Rodríguez is a(n) 29 year old female who presents today as return patient for rash.    Patient reports there is bumping that is not raised on her arm with minor spreading to chest. It is not painful. She traveled recently on a cruise to the [facility], and the rash appeared after swimming in the ocean. She notes using caution with what she ate and drank while in the [facility]. She had a sunburn on her ears that was itching, but she notes it could have also been rashing. She uses 30 SPF with occasional 50 SPF when in higher elevation. None of her sunscreens are new, and she has been using the same ones for one year.      Patient is otherwise feeling well, without additional skin concerns.    Labs:  None    Physical exam:  Vitals: BP (!) 136/94 (BP Location: Right arm, Patient Position: Chair, Cuff Size: Adult Regular)   Pulse 90   Resp 14   GEN: This is a well developed, well-nourished female in no acute distress, in a pleasant mood.    SKIN: Focused examination of the upper body was performed.  - well defined geographic reddish-tan patches on R upper chest, R upper arm and the left lower helix with epidermal peeling  - few well defined papules on L lower forearm  - No other lesions of concern on areas examined.                             Medications:  Current Outpatient Medications   Medication Sig Dispense Refill    benzoyl peroxide 5 % external liquid Apply topically daily 226 g 11    Multiple Vitamin (MULTIVITAMIN OR) Take  by mouth daily.      spironolactone (ALDACTONE) 50 MG tablet Take 2  tablets (100 mg) by mouth at bedtime. 60 tablet 11    adapalene (DIFFERIN) 0.1 % external cream Apply topically at bedtime Pea-sized amount to whole face (Patient not taking: Reported on 1/29/2025) 45 g 3     No current facility-administered medications for this visit.      Past Medical History:   Patient Active Problem List   Diagnosis    Acne    Pituitary dwarfism     No past medical history on file.     CC Tatiana Chan MD  [address] [facility] [address]  [place] [address] on close of this encounter.